# Patient Record
Sex: MALE | Race: WHITE | NOT HISPANIC OR LATINO | URBAN - METROPOLITAN AREA
[De-identification: names, ages, dates, MRNs, and addresses within clinical notes are randomized per-mention and may not be internally consistent; named-entity substitution may affect disease eponyms.]

---

## 2019-06-24 ENCOUNTER — APPOINTMENT (RX ONLY)
Dept: URBAN - METROPOLITAN AREA CLINIC 51 | Facility: CLINIC | Age: 64
Setting detail: DERMATOLOGY
End: 2019-06-24

## 2019-06-24 DIAGNOSIS — L72.8 OTHER FOLLICULAR CYSTS OF THE SKIN AND SUBCUTANEOUS TISSUE: ICD-10-CM

## 2019-06-24 DIAGNOSIS — L57.0 ACTINIC KERATOSIS: ICD-10-CM

## 2019-06-24 DIAGNOSIS — L91.8 OTHER HYPERTROPHIC DISORDERS OF THE SKIN: ICD-10-CM

## 2019-06-24 DIAGNOSIS — L82.1 OTHER SEBORRHEIC KERATOSIS: ICD-10-CM

## 2019-06-24 PROBLEM — H91.90 UNSPECIFIED HEARING LOSS, UNSPECIFIED EAR: Status: ACTIVE | Noted: 2019-06-24

## 2019-06-24 PROBLEM — J44.9 CHRONIC OBSTRUCTIVE PULMONARY DISEASE, UNSPECIFIED: Status: ACTIVE | Noted: 2019-06-24

## 2019-06-24 PROBLEM — M12.9 ARTHROPATHY, UNSPECIFIED: Status: ACTIVE | Noted: 2019-06-24

## 2019-06-24 PROCEDURE — ? COUNSELING

## 2019-06-24 PROCEDURE — 99202 OFFICE O/P NEW SF 15 MIN: CPT | Mod: 25

## 2019-06-24 PROCEDURE — ? LIQUID NITROGEN

## 2019-06-24 PROCEDURE — 17000 DESTRUCT PREMALG LESION: CPT

## 2019-06-24 ASSESSMENT — LOCATION DETAILED DESCRIPTION DERM
LOCATION DETAILED: LEFT INFERIOR FOREHEAD
LOCATION DETAILED: LEFT PROXIMAL DORSAL FOREARM
LOCATION DETAILED: LEFT CENTRAL LATERAL NECK
LOCATION DETAILED: RIGHT PROXIMAL DORSAL FOREARM
LOCATION DETAILED: INFERIOR THORACIC SPINE
LOCATION DETAILED: RIGHT CENTRAL LATERAL NECK
LOCATION DETAILED: LEFT SUPERIOR UPPER BACK

## 2019-06-24 ASSESSMENT — LOCATION SIMPLE DESCRIPTION DERM
LOCATION SIMPLE: LEFT FOREARM
LOCATION SIMPLE: UPPER BACK
LOCATION SIMPLE: LEFT UPPER BACK
LOCATION SIMPLE: RIGHT FOREARM
LOCATION SIMPLE: NECK
LOCATION SIMPLE: LEFT FOREHEAD

## 2019-06-24 ASSESSMENT — LOCATION ZONE DERM
LOCATION ZONE: TRUNK
LOCATION ZONE: ARM
LOCATION ZONE: NECK
LOCATION ZONE: FACE

## 2019-06-24 ASSESSMENT — PAIN INTENSITY VAS: HOW INTENSE IS YOUR PAIN 0 BEING NO PAIN, 10 BEING THE MOST SEVERE PAIN POSSIBLE?: NO PAIN

## 2019-06-24 NOTE — PROCEDURE: LIQUID NITROGEN
Consent: The patient's verbal consent was obtained including but not limited to risks of crusting, scabbing, blistering, scarring, darker or lighter pigmentary change, recurrence, incomplete removal and infection.
Render Note In Bullet Format When Appropriate: No
Post-Care Instructions: I reviewed with the patient in detail post-care instructions. Patient is to wear sunprotection, and avoid picking at any of the treated lesions. Pt may shower as normal, and apply Vaseline to crusted or scabbing areas.
Number Of Freeze-Thaw Cycles: 2 freeze-thaw cycles
Detail Level: Zone
Render Post-Care Instructions In Note?: yes
Duration Of Freeze Thaw-Cycle (Seconds): 5

## 2021-11-09 ENCOUNTER — APPOINTMENT (RX ONLY)
Dept: URBAN - METROPOLITAN AREA CLINIC 51 | Facility: CLINIC | Age: 66
Setting detail: DERMATOLOGY
End: 2021-11-09

## 2021-11-09 DIAGNOSIS — D18.0 HEMANGIOMA: ICD-10-CM | Status: STABLE

## 2021-11-09 DIAGNOSIS — L82.1 OTHER SEBORRHEIC KERATOSIS: ICD-10-CM | Status: STABLE

## 2021-11-09 DIAGNOSIS — L57.0 ACTINIC KERATOSIS: ICD-10-CM | Status: WORSENING

## 2021-11-09 DIAGNOSIS — L81.4 OTHER MELANIN HYPERPIGMENTATION: ICD-10-CM | Status: STABLE

## 2021-11-09 DIAGNOSIS — B07.8 OTHER VIRAL WARTS: ICD-10-CM | Status: WORSENING

## 2021-11-09 DIAGNOSIS — L82.0 INFLAMED SEBORRHEIC KERATOSIS: ICD-10-CM | Status: WORSENING

## 2021-11-09 DIAGNOSIS — L72.8 OTHER FOLLICULAR CYSTS OF THE SKIN AND SUBCUTANEOUS TISSUE: ICD-10-CM | Status: STABLE

## 2021-11-09 PROBLEM — D18.01 HEMANGIOMA OF SKIN AND SUBCUTANEOUS TISSUE: Status: ACTIVE | Noted: 2021-11-09

## 2021-11-09 PROCEDURE — ? COUNSELING

## 2021-11-09 PROCEDURE — 99213 OFFICE O/P EST LOW 20 MIN: CPT | Mod: 25

## 2021-11-09 PROCEDURE — ? LIQUID NITROGEN

## 2021-11-09 PROCEDURE — 17110 DESTRUCTION B9 LES UP TO 14: CPT

## 2021-11-09 PROCEDURE — 17000 DESTRUCT PREMALG LESION: CPT | Mod: 59

## 2021-11-09 ASSESSMENT — LOCATION DETAILED DESCRIPTION DERM
LOCATION DETAILED: SUPERIOR THORACIC SPINE
LOCATION DETAILED: LEFT POSTERIOR SHOULDER
LOCATION DETAILED: RIGHT SUPERIOR ANTERIOR NECK
LOCATION DETAILED: LEFT DISTAL POSTERIOR UPPER ARM
LOCATION DETAILED: LEFT INFERIOR MEDIAL MIDBACK
LOCATION DETAILED: RIGHT SUPERIOR HELIX
LOCATION DETAILED: LEFT SUPERIOR UPPER BACK
LOCATION DETAILED: RIGHT CENTRAL TEMPLE
LOCATION DETAILED: RIGHT POSTERIOR SHOULDER
LOCATION DETAILED: RIGHT MID-UPPER BACK
LOCATION DETAILED: LEFT SUPERIOR MEDIAL MIDBACK

## 2021-11-09 ASSESSMENT — LOCATION SIMPLE DESCRIPTION DERM
LOCATION SIMPLE: RIGHT UPPER BACK
LOCATION SIMPLE: RIGHT SHOULDER
LOCATION SIMPLE: RIGHT EAR
LOCATION SIMPLE: LEFT SHOULDER
LOCATION SIMPLE: LEFT LOWER BACK
LOCATION SIMPLE: LEFT UPPER ARM
LOCATION SIMPLE: LEFT UPPER BACK
LOCATION SIMPLE: UPPER BACK
LOCATION SIMPLE: RIGHT TEMPLE
LOCATION SIMPLE: RIGHT ANTERIOR NECK

## 2021-11-09 ASSESSMENT — LOCATION ZONE DERM
LOCATION ZONE: ARM
LOCATION ZONE: FACE
LOCATION ZONE: TRUNK
LOCATION ZONE: NECK
LOCATION ZONE: EAR

## 2021-11-09 ASSESSMENT — PAIN INTENSITY VAS: HOW INTENSE IS YOUR PAIN 0 BEING NO PAIN, 10 BEING THE MOST SEVERE PAIN POSSIBLE?: NO PAIN

## 2021-11-09 NOTE — PROCEDURE: LIQUID NITROGEN
Show Applicator Variable?: Yes
Detail Level: Detailed
Post-Care Instructions: I reviewed with the patient in detail post-care instructions. Patient is to wear sunprotection, and avoid picking at any of the treated lesions. Pt may apply Vaseline to crusted or scabbing areas.
Add 52 Modifier (Optional): no
Number Of Freeze-Thaw Cycles: 2 freeze-thaw cycles
Consent: The patient's consent was obtained including but not limited to risks of crusting, scabbing, blistering, scarring, darker or lighter pigmentary change, recurrence, incomplete removal and infection.
Medical Necessity Information: It is in your best interest to select a reason for this procedure from the list below. All of these items fulfill various CMS LCD requirements except the new and changing color options.
Duration Of Freeze Thaw-Cycle (Seconds): 5-10
Pared With?: curette
Duration Of Freeze Thaw-Cycle (Seconds): 5
Consent: The patient's verbal consent was obtained including but not limited to risks of crusting, scabbing, blistering, scarring, darker or lighter pigmentary change, recurrence, incomplete removal and infection.
Post-Care Instructions: I reviewed with the patient in detail post-care instructions. Patient is to wear sunprotection, and avoid picking at any of the treated lesions. Pt may shower as normal, and apply Vaseline to crusted or scabbing areas.
Detail Level: Simple

## 2022-05-31 ENCOUNTER — ANESTHESIA EVENT (OUTPATIENT)
Dept: SURGERY | Age: 67
End: 2022-05-31
Payer: MEDICARE

## 2022-05-31 ENCOUNTER — HOSPITAL ENCOUNTER (OUTPATIENT)
Dept: PREADMISSION TESTING | Age: 67
Discharge: HOME | End: 2022-05-31
Payer: MEDICARE

## 2022-05-31 ENCOUNTER — HOSPITAL ENCOUNTER (OUTPATIENT)
Dept: PREADMISSION TESTING | Age: 67
Discharge: HOME | End: 2022-05-31

## 2022-05-31 VITALS — WEIGHT: 201.06 LBS | BODY MASS INDEX: 31.56 KG/M2 | HEIGHT: 67 IN

## 2022-05-31 PROBLEM — E78.00 HIGH CHOLESTEROL: Chronic | Status: ACTIVE | Noted: 2022-05-31

## 2022-05-31 PROBLEM — S76.111A QUADRICEPS TENDON RUPTURE, RIGHT, INITIAL ENCOUNTER: Status: ACTIVE | Noted: 2022-05-31

## 2022-05-31 PROBLEM — E78.00 HIGH CHOLESTEROL: Status: ACTIVE | Noted: 2022-05-31

## 2022-05-31 PROBLEM — I10 HTN (HYPERTENSION): Chronic | Status: ACTIVE | Noted: 2022-05-31

## 2022-05-31 PROBLEM — K21.9 GERD (GASTROESOPHAGEAL REFLUX DISEASE): Chronic | Status: ACTIVE | Noted: 2022-05-31

## 2022-05-31 PROBLEM — E03.9 ACQUIRED HYPOTHYROIDISM: Status: ACTIVE | Noted: 2022-05-31

## 2022-05-31 PROBLEM — S76.111A QUADRICEPS TENDON RUPTURE, RIGHT, INITIAL ENCOUNTER: Chronic | Status: ACTIVE | Noted: 2022-05-31

## 2022-05-31 PROBLEM — E03.9 ACQUIRED HYPOTHYROIDISM: Chronic | Status: ACTIVE | Noted: 2022-05-31

## 2022-05-31 PROBLEM — I25.2 HISTORY OF MI (MYOCARDIAL INFARCTION): Status: ACTIVE | Noted: 2022-05-31

## 2022-05-31 PROBLEM — I25.2 HISTORY OF MI (MYOCARDIAL INFARCTION): Chronic | Status: ACTIVE | Noted: 2022-05-31

## 2022-05-31 PROBLEM — I10 HTN (HYPERTENSION): Status: ACTIVE | Noted: 2022-05-31

## 2022-05-31 PROBLEM — K21.9 GERD (GASTROESOPHAGEAL REFLUX DISEASE): Status: ACTIVE | Noted: 2022-05-31

## 2022-05-31 LAB
ALBUMIN SERPL-MCNC: 3.6 G/DL (ref 3.4–5)
ALBUMIN/GLOB SERPL: 1 {RATIO} (ref 0.8–1.7)
ALP SERPL-CCNC: 77 U/L (ref 45–117)
ALT SERPL-CCNC: 23 U/L (ref 16–61)
ANION GAP SERPL CALC-SCNC: 6 MMOL/L (ref 3–18)
APTT PPP: 25.6 SEC (ref 23–36.4)
AST SERPL-CCNC: 17 U/L (ref 10–38)
BILIRUB SERPL-MCNC: 1.3 MG/DL (ref 0.2–1)
BUN SERPL-MCNC: 13 MG/DL (ref 7–18)
BUN/CREAT SERPL: 13 (ref 12–20)
CALCIUM SERPL-MCNC: 8.9 MG/DL (ref 8.5–10.1)
CHLORIDE SERPL-SCNC: 106 MMOL/L (ref 100–111)
CO2 SERPL-SCNC: 29 MMOL/L (ref 21–32)
CREAT SERPL-MCNC: 1 MG/DL (ref 0.6–1.3)
ERYTHROCYTE [DISTWIDTH] IN BLOOD BY AUTOMATED COUNT: 11.7 % (ref 11.6–14.5)
GLOBULIN SER CALC-MCNC: 3.7 G/DL (ref 2–4)
GLUCOSE SERPL-MCNC: 94 MG/DL (ref 74–99)
HCT VFR BLD AUTO: 47.9 % (ref 36–48)
HGB BLD-MCNC: 15.7 G/DL (ref 13–16)
INR PPP: 1.2 (ref 0.8–1.2)
MCH RBC QN AUTO: 31.1 PG (ref 24–34)
MCHC RBC AUTO-ENTMCNC: 32.8 G/DL (ref 31–37)
MCV RBC AUTO: 94.9 FL (ref 78–100)
NRBC # BLD: 0 K/UL (ref 0–0.01)
NRBC BLD-RTO: 0 PER 100 WBC
PLATELET # BLD AUTO: 146 K/UL (ref 135–420)
PMV BLD AUTO: 12.5 FL (ref 9.2–11.8)
POTASSIUM SERPL-SCNC: 3.9 MMOL/L (ref 3.5–5.5)
PROT SERPL-MCNC: 7.3 G/DL (ref 6.4–8.2)
PROTHROMBIN TIME: 15.1 SEC (ref 11.5–15.2)
RBC # BLD AUTO: 5.05 M/UL (ref 4.35–5.65)
SODIUM SERPL-SCNC: 141 MMOL/L (ref 136–145)
WBC # BLD AUTO: 4.7 K/UL (ref 4.6–13.2)

## 2022-05-31 PROCEDURE — 93005 ELECTROCARDIOGRAM TRACING: CPT

## 2022-05-31 PROCEDURE — 85730 THROMBOPLASTIN TIME PARTIAL: CPT

## 2022-05-31 PROCEDURE — 85027 COMPLETE CBC AUTOMATED: CPT

## 2022-05-31 PROCEDURE — 85610 PROTHROMBIN TIME: CPT

## 2022-05-31 PROCEDURE — 36415 COLL VENOUS BLD VENIPUNCTURE: CPT

## 2022-05-31 PROCEDURE — 80053 COMPREHEN METABOLIC PANEL: CPT

## 2022-05-31 RX ORDER — SODIUM CHLORIDE 0.9 % (FLUSH) 0.9 %
5-40 SYRINGE (ML) INJECTION EVERY 8 HOURS
Status: CANCELLED | OUTPATIENT
Start: 2022-05-31

## 2022-05-31 RX ORDER — METOPROLOL SUCCINATE 50 MG/1
TABLET, EXTENDED RELEASE ORAL DAILY
COMMUNITY

## 2022-05-31 RX ORDER — ATENOLOL 25 MG/1
1 TABLET ORAL DAILY
COMMUNITY
Start: 2021-07-19 | End: 2022-05-31 | Stop reason: CLARIF

## 2022-05-31 RX ORDER — LEVOTHYROXINE SODIUM 88 UG/1
88 TABLET ORAL DAILY
COMMUNITY
End: 2022-05-31 | Stop reason: CLARIF

## 2022-05-31 RX ORDER — OXYCODONE HYDROCHLORIDE 5 MG/1
5 TABLET ORAL AS NEEDED
Status: CANCELLED | OUTPATIENT
Start: 2022-05-31

## 2022-05-31 RX ORDER — LAMOTRIGINE 100 MG/1
300 TABLET ORAL
COMMUNITY
End: 2022-05-31 | Stop reason: CLARIF

## 2022-05-31 RX ORDER — TRAMADOL HYDROCHLORIDE 50 MG/1
50-100 TABLET ORAL
COMMUNITY

## 2022-05-31 RX ORDER — SACUBITRIL AND VALSARTAN 97; 103 MG/1; MG/1
1 TABLET, FILM COATED ORAL 2 TIMES DAILY
COMMUNITY
Start: 2021-09-10

## 2022-05-31 RX ORDER — ATORVASTATIN CALCIUM 80 MG/1
1 TABLET, FILM COATED ORAL DAILY
COMMUNITY
Start: 2022-04-08

## 2022-05-31 RX ORDER — LISINOPRIL 5 MG/1
5 TABLET ORAL DAILY
COMMUNITY
End: 2022-05-31 | Stop reason: CLARIF

## 2022-05-31 RX ORDER — LEVETIRACETAM 500 MG/1
TABLET ORAL 2 TIMES DAILY
COMMUNITY

## 2022-05-31 RX ORDER — MELOXICAM 15 MG/1
15 TABLET ORAL DAILY
COMMUNITY

## 2022-05-31 RX ORDER — SODIUM CHLORIDE, SODIUM LACTATE, POTASSIUM CHLORIDE, CALCIUM CHLORIDE 600; 310; 30; 20 MG/100ML; MG/100ML; MG/100ML; MG/100ML
125 INJECTION, SOLUTION INTRAVENOUS CONTINUOUS
Status: CANCELLED | OUTPATIENT
Start: 2022-05-31

## 2022-05-31 RX ORDER — OMEPRAZOLE 40 MG/1
CAPSULE, DELAYED RELEASE ORAL
COMMUNITY
Start: 2022-04-25

## 2022-05-31 RX ORDER — LEVOTHYROXINE SODIUM 100 UG/1
100 TABLET ORAL DAILY
COMMUNITY
Start: 2022-01-27

## 2022-05-31 RX ORDER — SODIUM CHLORIDE 0.9 % (FLUSH) 0.9 %
5-40 SYRINGE (ML) INJECTION AS NEEDED
Status: CANCELLED | OUTPATIENT
Start: 2022-05-31

## 2022-05-31 RX ORDER — CELECOXIB 200 MG/1
200 CAPSULE ORAL DAILY
COMMUNITY
End: 2022-06-01

## 2022-05-31 RX ORDER — FAMOTIDINE 40 MG/1
40 TABLET, FILM COATED ORAL DAILY
COMMUNITY
Start: 2021-12-15

## 2022-05-31 RX ORDER — ASPIRIN 81 MG/1
81 TABLET ORAL DAILY
COMMUNITY

## 2022-05-31 NOTE — PERIOP NOTES
Leave all valuables at home or loved ones;to include wallets/purse, money/credit cards, electronics  such as laptops and tablets. If you want to have your prescriptions filled here, please have some form of payment with your . Please arrange for your transportation home Patient states family physician is aware of upcoming procedure/surgery. Do not put any lotion, jewelry, makeup, fingernail or toenail polish; no wigs, no private piercings; no tictac,gum and mouthwash. Denies sleep apnea. Denies family history of anesthesia complications. Please be aware that due to unforeseen circumstances, delays may occur and your patience will be appreciated. If you ae scheduled to be discharged the same day, please plan to be with us for most of the day. If an inpatient, room assignments may be delayed as well. Our priority is to make you as comfortable as possible and to keep your family informed of your status when possible. No dnr  LM with [facility] cardiology medical records and faxed request for urgent. [employer]; rep contacted will need magnet if you use cautery.

## 2022-05-31 NOTE — H&P
History and Physical        Patient: Alexy Cruise               Sex: male          DOA: (Not on file)         YOB: 1955      Age:  78 y.o.        LOS:  LOS: 0 days        HPI:     Nehemias Salgado is in for a new problem today regarding a right quadriceps tendon rupture from 04/23/2022/mild medial tibiofemoral/patellofemoral DJD/status post left knee arthroscopy 2013 with grade 3 medial and grade 2 patellar changes/[employer]. The patient is in today. He tripped and fell wearing some long nosed shoes in April of 2022. When he landed, he landed on his knee in a flexed fashion. He has had a lot of swelling and discomfort since that time. He is currently on Plavix and aspirin. He is now here today for evaluation and treatment. His wife reports he continues to fall. X-rays of the right knee reviewed from outside show patella baja and calcifications that have migrated proximal to this patella. Past Medical History:   Diagnosis Date    Arthritis     CAD (coronary artery disease)     mi 2004    Chronic pain     back    GERD (gastroesophageal reflux disease)     Hypercholesteremia     Hypertension 2000    Thyroid disease     hyper       Past Surgical History:   Procedure Laterality Date    HX KNEE ARTHROSCOPY Bilateral     HX ORTHOPAEDIC      back fused     HX ORTHOPAEDIC      neck fused    HX PACEMAKER      [employer] left chest defib    HX SHOULDER ARTHROSCOPY      tear    VASCULAR SURGERY PROCEDURE UNLIST      3 stents       No family history on file. Social History     Socioeconomic History    Marital status:    Tobacco Use    Smoking status: Former Smoker    Smokeless tobacco: Never Used   Vaping Use    Vaping Use: Never used   Substance and Sexual Activity    Alcohol use: Not Currently    Drug use: Never    Sexual activity: Never       Prior to Admission medications    Medication Sig Start Date End Date Taking?  Authorizing Provider aspirin delayed-release 81 mg tablet Take 81 mg by mouth daily. Provider, Historical   atorvastatin (LIPITOR) 80 mg tablet Take 1 Tablet by mouth daily. 4/8/22   Provider, Historical   celecoxib (CELEBREX) 200 mg capsule Take 200 mg by mouth daily. Provider, Historical   famotidine (PEPCID) 40 mg tablet Take 40 mg by mouth daily. 12/15/21   Provider, Historical   sacubitriL-valsartan (Entresto)  mg tablet Take 1 Tablet by mouth two (2) times a day. 9/10/21   Provider, Historical   ticagrelor (BRILINTA) 60 mg tab tablet Take 60 mg by mouth. Provider, Historical   traMADoL (ULTRAM) 50 mg tablet Take  mg by mouth nightly. Provider, Historical   omeprazole (PRILOSEC) 40 mg capsule TAKE ONE CAPSULE BY MOUTH EVERY MORNING BEORE BREAKFAST 4/25/22   Provider, Historical   levothyroxine (SYNTHROID) 100 mcg tablet Take 100 mcg by mouth daily. 1/27/22   Provider, Historical   levETIRAcetam (KEPPRA) 500 mg tablet Take  by mouth two (2) times a day. Indications: back pain    Provider, Historical   metoprolol succinate (TOPROL-XL) 50 mg XL tablet Take  by mouth daily. Provider, Historical   meloxicam (MOBIC) 15 mg tablet Take 15 mg by mouth daily. Provider, Historical       Allergies   Allergen Reactions    Sting, Bee Other (comments)    Amoxicillin-Pot Clavulanate Diarrhea     diarrhea          Omega-3 Acid Ethyl Esters Other (comments)     Woozy and legs swelled            Review of Systems  GENERAL:  Patient has no signs of fever, chills or weight change. or fatigue  HEAD/ENTM:  Patient has no signs of headaches, dizziness, hearing loss, ringing in ears, sore throat/hoarseness, recent cold, double vision, blurred vision, itchy eyes, eye redness or eye discharge. NEUROLOGIC:  Patient has no signs of fainting, muscle weakness, numbness/tingling, loss of balance or seizure disorder.   CARDIOVASCULAR:  Patient has no signs of chest pain, palpitations, rheumatic fever or heart murmur. RESPIRATORY:  Patient has no signs of chronic cough, wheezing, difficulty breathing, pain on breathing or shortness of breath. GASTROINTESTINAL:  Patient has no signs of nausea/vomiting, difficulty swallowing, gas/bloating, indigestion, abdominal pain, diarrhea, bloody stools or hemorrhoids. GENITOURINARY:  Patient has no signs of blood in urine, painful urinating, burning sensation, bladder/kidney infection, frequent urinating or incontinence. MUSCULOSKELETAL: Patient presents with joint pain. Patient has no signs of fracture/dislocation, sprain/strain, tendonitis, joint stiffness, rheumatoid disease, gout or swelling of feet. INTEGUMENTARY:  Patient has no signs of rash/itching, psoriasis, Raynaud's phenomenon or varicose veins. EMOTIONAL:  Patient has no signs of anxiety, depression, bipolar disorder, memory loss or change in mood. Physical Exam:      There were no vitals taken for this visit. Physical Exam:  Physical exam shows that his right knee will not straighten fully. He is missing about the last 30 degrees of extension. He has swelling in the quadriceps tendon region of his knee. There is a palpable defect that is present. He has no significant pain on the medial or lateral joint lines. The patient has good capillary refill with normal motor strength of the foot and ankle and normal light-touch sensation in the foot and lower leg. Assessment/Plan     Principal Problem:    Quadriceps tendon rupture, right, initial encounter (5/31/2022)    Active Problems:    GERD (gastroesophageal reflux disease) (5/31/2022)      High cholesterol (5/31/2022)      HTN (hypertension) (5/31/2022)      History of MI (myocardial infarction) (5/31/2022)      Acquired hypothyroidism (5/31/2022)    Dr. Farshad Khan scheduled him for a right quadriceps tendon repair at [facility] June 1, 2022.   He talked to the patient about the risks, the alternatives, and the benefits including infection, pain, bleeding, and DVT. He understands these and is willing to proceed. Dr. Poly Trimble  issued Keflex and Roxicodone for perioperative pain management. He given him a prescription today to get a range of motion brace ahead of time if he can. If not, we will issue him one at the time of the OR. Dr. Poly Trimble will see him again 10 days postop. He discussed the postoperative protocol with him today.

## 2022-06-01 ENCOUNTER — ANESTHESIA (OUTPATIENT)
Dept: SURGERY | Age: 67
End: 2022-06-01
Payer: MEDICARE

## 2022-06-01 ENCOUNTER — HOSPITAL ENCOUNTER (OUTPATIENT)
Age: 67
Discharge: HOME | End: 2022-06-01
Attending: ORTHOPAEDIC SURGERY | Admitting: ORTHOPAEDIC SURGERY
Payer: MEDICARE

## 2022-06-01 VITALS
SYSTOLIC BLOOD PRESSURE: 170 MMHG | HEIGHT: 67 IN | WEIGHT: 197 LBS | OXYGEN SATURATION: 99 % | RESPIRATION RATE: 14 BRPM | HEART RATE: 69 BPM | BODY MASS INDEX: 30.92 KG/M2 | DIASTOLIC BLOOD PRESSURE: 75 MMHG | TEMPERATURE: 97.5 F

## 2022-06-01 LAB
ATRIAL RATE: 44 BPM
CALCULATED P AXIS, ECG09: 45 DEGREES
CALCULATED R AXIS, ECG10: -102 DEGREES
CALCULATED T AXIS, ECG11: 142 DEGREES
DIAGNOSIS, 93000: NORMAL
P-R INTERVAL, ECG05: 100 MS
Q-T INTERVAL, ECG07: 548 MS
QRS DURATION, ECG06: 166 MS
QTC CALCULATION (BEZET), ECG08: 468 MS
VENTRICULAR RATE, ECG03: 44 BPM

## 2022-06-01 PROCEDURE — 74011000250 HC RX REV CODE- 250: Performed by: ORTHOPAEDIC SURGERY

## 2022-06-01 PROCEDURE — 74011250636 HC RX REV CODE- 250/636: Performed by: PHYSICIAN ASSISTANT

## 2022-06-01 PROCEDURE — 76210000026 HC REC RM PH II 1 TO 1.5 HR: Performed by: ORTHOPAEDIC SURGERY

## 2022-06-01 PROCEDURE — 76942 ECHO GUIDE FOR BIOPSY: CPT | Performed by: ORTHOPAEDIC SURGERY

## 2022-06-01 PROCEDURE — 77030016060 HC NDL NRV BLK TELE -A: Performed by: ANESTHESIOLOGY

## 2022-06-01 PROCEDURE — 74011000250 HC RX REV CODE- 250: Performed by: NURSE ANESTHETIST, CERTIFIED REGISTERED

## 2022-06-01 PROCEDURE — L1810 KO ELASTIC WITH JOINTS: HCPCS | Performed by: ORTHOPAEDIC SURGERY

## 2022-06-01 PROCEDURE — 74011250637 HC RX REV CODE- 250/637: Performed by: ANESTHESIOLOGY

## 2022-06-01 PROCEDURE — 76010000153 HC OR TIME 1.5 TO 2 HR: Performed by: ORTHOPAEDIC SURGERY

## 2022-06-01 PROCEDURE — 77030008190 HC RTVR SUT ARTH S&N -B: Performed by: ORTHOPAEDIC SURGERY

## 2022-06-01 PROCEDURE — 74011250636 HC RX REV CODE- 250/636: Performed by: ANESTHESIOLOGY

## 2022-06-01 PROCEDURE — 74011250636 HC RX REV CODE- 250/636: Performed by: NURSE ANESTHETIST, CERTIFIED REGISTERED

## 2022-06-01 PROCEDURE — 77030013708 HC HNDPC SUC IRR PULS STRY –B: Performed by: ORTHOPAEDIC SURGERY

## 2022-06-01 PROCEDURE — 77030011256 HC DRSG MEPILEX <16IN NO BORD MOLN -A: Performed by: ORTHOPAEDIC SURGERY

## 2022-06-01 PROCEDURE — 77030040361 HC SLV COMPR DVT MDII -B: Performed by: ORTHOPAEDIC SURGERY

## 2022-06-01 PROCEDURE — 2709999900 HC NON-CHARGEABLE SUPPLY: Performed by: ORTHOPAEDIC SURGERY

## 2022-06-01 PROCEDURE — 74011000258 HC RX REV CODE- 258: Performed by: ORTHOPAEDIC SURGERY

## 2022-06-01 PROCEDURE — 74011000250 HC RX REV CODE- 250: Performed by: ANESTHESIOLOGY

## 2022-06-01 PROCEDURE — 76060000034 HC ANESTHESIA 1.5 TO 2 HR: Performed by: ORTHOPAEDIC SURGERY

## 2022-06-01 PROCEDURE — 77030031139 HC SUT VCRL2 J&J -A: Performed by: ORTHOPAEDIC SURGERY

## 2022-06-01 PROCEDURE — 77030034479 HC ADH SKN CLSR PRINEO J&J -B: Performed by: ORTHOPAEDIC SURGERY

## 2022-06-01 PROCEDURE — 77030020782 HC GWN BAIR PAWS FLX 3M -B: Performed by: ORTHOPAEDIC SURGERY

## 2022-06-01 PROCEDURE — 77030000032 HC CUF TRNQT ZIMM -B: Performed by: ORTHOPAEDIC SURGERY

## 2022-06-01 PROCEDURE — 76210000016 HC OR PH I REC 1 TO 1.5 HR: Performed by: ORTHOPAEDIC SURGERY

## 2022-06-01 PROCEDURE — C1713 ANCHOR/SCREW BN/BN,TIS/BN: HCPCS | Performed by: ORTHOPAEDIC SURGERY

## 2022-06-01 PROCEDURE — 74011000272 HC RX REV CODE- 272: Performed by: ORTHOPAEDIC SURGERY

## 2022-06-01 PROCEDURE — 74011250636 HC RX REV CODE- 250/636: Performed by: ORTHOPAEDIC SURGERY

## 2022-06-01 PROCEDURE — 64447 NJX AA&/STRD FEMORAL NRV IMG: CPT | Performed by: ANESTHESIOLOGY

## 2022-06-01 PROCEDURE — 77030012508 HC MSK AIRWY LMA AMBU -A: Performed by: ANESTHESIOLOGY

## 2022-06-01 RX ORDER — LIDOCAINE HYDROCHLORIDE 20 MG/ML
INJECTION, SOLUTION EPIDURAL; INFILTRATION; INTRACAUDAL; PERINEURAL AS NEEDED
Status: DISCONTINUED | OUTPATIENT
Start: 2022-06-01 | End: 2022-06-01 | Stop reason: HOSPADM

## 2022-06-01 RX ORDER — SODIUM CHLORIDE 0.9 % (FLUSH) 0.9 %
5-40 SYRINGE (ML) INJECTION EVERY 8 HOURS
Status: DISCONTINUED | OUTPATIENT
Start: 2022-06-01 | End: 2022-06-01 | Stop reason: HOSPADM

## 2022-06-01 RX ORDER — HYDROMORPHONE HYDROCHLORIDE 1 MG/ML
0.5 INJECTION, SOLUTION INTRAMUSCULAR; INTRAVENOUS; SUBCUTANEOUS
Status: DISCONTINUED | OUTPATIENT
Start: 2022-06-01 | End: 2022-06-01 | Stop reason: HOSPADM

## 2022-06-01 RX ORDER — DEXMEDETOMIDINE HYDROCHLORIDE 100 UG/ML
INJECTION, SOLUTION INTRAVENOUS
Status: SHIPPED | OUTPATIENT
Start: 2022-06-01 | End: 2022-06-01

## 2022-06-01 RX ORDER — SODIUM CHLORIDE, SODIUM LACTATE, POTASSIUM CHLORIDE, CALCIUM CHLORIDE 600; 310; 30; 20 MG/100ML; MG/100ML; MG/100ML; MG/100ML
125 INJECTION, SOLUTION INTRAVENOUS CONTINUOUS
Status: DISCONTINUED | OUTPATIENT
Start: 2022-06-01 | End: 2022-06-01 | Stop reason: HOSPADM

## 2022-06-01 RX ORDER — ACETAMINOPHEN 500 MG
1000 TABLET ORAL
Status: COMPLETED | OUTPATIENT
Start: 2022-06-01 | End: 2022-06-01

## 2022-06-01 RX ORDER — KETAMINE HYDROCHLORIDE 10 MG/ML
INJECTION, SOLUTION INTRAMUSCULAR; INTRAVENOUS AS NEEDED
Status: DISCONTINUED | OUTPATIENT
Start: 2022-06-01 | End: 2022-06-01 | Stop reason: HOSPADM

## 2022-06-01 RX ORDER — CEFAZOLIN SODIUM/WATER 2 G/20 ML
2 SYRINGE (ML) INTRAVENOUS ONCE
Status: COMPLETED | OUTPATIENT
Start: 2022-06-01 | End: 2022-06-01

## 2022-06-01 RX ORDER — DEXAMETHASONE SODIUM PHOSPHATE 10 MG/ML
INJECTION INTRAMUSCULAR; INTRAVENOUS
Status: SHIPPED | OUTPATIENT
Start: 2022-06-01 | End: 2022-06-01

## 2022-06-01 RX ORDER — MIDAZOLAM HYDROCHLORIDE 1 MG/ML
INJECTION, SOLUTION INTRAMUSCULAR; INTRAVENOUS AS NEEDED
Status: DISCONTINUED | OUTPATIENT
Start: 2022-06-01 | End: 2022-06-01 | Stop reason: HOSPADM

## 2022-06-01 RX ORDER — SODIUM CHLORIDE 0.9 % (FLUSH) 0.9 %
5-40 SYRINGE (ML) INJECTION AS NEEDED
Status: DISCONTINUED | OUTPATIENT
Start: 2022-06-01 | End: 2022-06-01 | Stop reason: HOSPADM

## 2022-06-01 RX ORDER — FENTANYL CITRATE 50 UG/ML
25 INJECTION, SOLUTION INTRAMUSCULAR; INTRAVENOUS AS NEEDED
Status: DISCONTINUED | OUTPATIENT
Start: 2022-06-01 | End: 2022-06-01 | Stop reason: HOSPADM

## 2022-06-01 RX ORDER — CEPHALEXIN 500 MG/1
500 CAPSULE ORAL 4 TIMES DAILY
Qty: 4 CAPSULE | Refills: 0 | Status: SHIPPED
Start: 2022-06-01 | End: 2022-06-02

## 2022-06-01 RX ORDER — ONDANSETRON 2 MG/ML
INJECTION INTRAMUSCULAR; INTRAVENOUS AS NEEDED
Status: DISCONTINUED | OUTPATIENT
Start: 2022-06-01 | End: 2022-06-01 | Stop reason: HOSPADM

## 2022-06-01 RX ORDER — PROPOFOL 10 MG/ML
INJECTION, EMULSION INTRAVENOUS AS NEEDED
Status: DISCONTINUED | OUTPATIENT
Start: 2022-06-01 | End: 2022-06-01 | Stop reason: HOSPADM

## 2022-06-01 RX ORDER — ROPIVACAINE HYDROCHLORIDE 2 MG/ML
INJECTION, SOLUTION EPIDURAL; INFILTRATION; PERINEURAL
Status: SHIPPED | OUTPATIENT
Start: 2022-06-01 | End: 2022-06-01

## 2022-06-01 RX ORDER — HYDROMORPHONE HYDROCHLORIDE 2 MG/ML
INJECTION, SOLUTION INTRAMUSCULAR; INTRAVENOUS; SUBCUTANEOUS AS NEEDED
Status: DISCONTINUED | OUTPATIENT
Start: 2022-06-01 | End: 2022-06-01 | Stop reason: HOSPADM

## 2022-06-01 RX ADMIN — PROPOFOL 150 MG: 10 INJECTION, EMULSION INTRAVENOUS at 13:53

## 2022-06-01 RX ADMIN — DEXMEDETOMIDINE HYDROCHLORIDE 15 MCG: 100 INJECTION, SOLUTION INTRAVENOUS at 12:22

## 2022-06-01 RX ADMIN — ROPIVACAINE HYDROCHLORIDE 25 ML: 2 INJECTION, SOLUTION EPIDURAL; INFILTRATION at 12:22

## 2022-06-01 RX ADMIN — SODIUM CHLORIDE, SODIUM LACTATE, POTASSIUM CHLORIDE, AND CALCIUM CHLORIDE 125 ML/HR: 600; 310; 30; 20 INJECTION, SOLUTION INTRAVENOUS at 15:55

## 2022-06-01 RX ADMIN — MIDAZOLAM 1 MG: 1 INJECTION INTRAMUSCULAR; INTRAVENOUS at 12:18

## 2022-06-01 RX ADMIN — DEXAMETHASONE SODIUM PHOSPHATE 4 MG: 10 INJECTION, SOLUTION INTRAMUSCULAR; INTRAVENOUS at 12:22

## 2022-06-01 RX ADMIN — ACETAMINOPHEN 1000 MG: 500 TABLET ORAL at 11:07

## 2022-06-01 RX ADMIN — HYDROMORPHONE HYDROCHLORIDE 0.5 MG: 2 INJECTION, SOLUTION INTRAMUSCULAR; INTRAVENOUS; SUBCUTANEOUS at 14:23

## 2022-06-01 RX ADMIN — KETAMINE HYDROCHLORIDE 30 MG: 10 INJECTION, SOLUTION INTRAMUSCULAR; INTRAVENOUS at 14:11

## 2022-06-01 RX ADMIN — MIDAZOLAM 4 MG: 1 INJECTION INTRAMUSCULAR; INTRAVENOUS at 12:17

## 2022-06-01 RX ADMIN — Medication 2 G: at 13:58

## 2022-06-01 RX ADMIN — HYDROMORPHONE HYDROCHLORIDE 0.5 MG: 2 INJECTION, SOLUTION INTRAMUSCULAR; INTRAVENOUS; SUBCUTANEOUS at 14:19

## 2022-06-01 RX ADMIN — LIDOCAINE HYDROCHLORIDE 80 MG: 20 INJECTION, SOLUTION INTRAVENOUS at 13:53

## 2022-06-01 RX ADMIN — HYDROMORPHONE HYDROCHLORIDE 1 MG: 2 INJECTION, SOLUTION INTRAMUSCULAR; INTRAVENOUS; SUBCUTANEOUS at 14:31

## 2022-06-01 RX ADMIN — MIDAZOLAM 2 MG: 1 INJECTION INTRAMUSCULAR; INTRAVENOUS at 13:44

## 2022-06-01 RX ADMIN — SODIUM CHLORIDE, POTASSIUM CHLORIDE, SODIUM LACTATE AND CALCIUM CHLORIDE 125 ML/HR: 600; 310; 30; 20 INJECTION, SOLUTION INTRAVENOUS at 11:05

## 2022-06-01 RX ADMIN — ONDANSETRON HYDROCHLORIDE 4 MG: 2 INJECTION INTRAMUSCULAR; INTRAVENOUS at 15:06

## 2022-06-01 RX ADMIN — KETAMINE HYDROCHLORIDE 10 MG: 10 INJECTION, SOLUTION INTRAMUSCULAR; INTRAVENOUS at 15:00

## 2022-06-01 RX ADMIN — KETAMINE HYDROCHLORIDE 10 MG: 10 INJECTION, SOLUTION INTRAMUSCULAR; INTRAVENOUS at 14:43

## 2022-06-01 NOTE — ANESTHESIA PROCEDURE NOTES
Peripheral Block    Start time: 6/1/2022 12:17 PM  End time: 6/1/2022 12:22 PM  Performed by: Ashok Paredes MD  Authorized by: Ashok Paredes MD       Pre-procedure: Indications: at surgeon's request and post-op pain management    Preanesthetic Checklist: patient identified, risks and benefits discussed, site marked, timeout performed, anesthesia consent given and patient being monitored    Timeout Time: 12:17 EDT          Block Type:   Block Type: Adductor canal block  Laterality:  Right  Monitoring:  Standard ASA monitoring, continuous pulse ox, frequent vital sign checks, heart rate, oxygen and responsive to questions  Injection Technique:  Single shot  Procedures: ultrasound guided    Procedures comment:  For needle placement  Patient Position: supine  Prep: chlorhexidine    Location:  Mid thigh  Needle Type:  Stimuplex  Needle Gauge:  21 G  Needle Localization:  Ultrasound guidance  Medication Injected:  Ropivacaine (NAROPIN) 2 mg/mL (0.2 %) injection, 25 mL  dexamethasone (PF) (DECADRON) 10 mg/mL injection, 4 mg  dexmedeTOMidine (PRECEDEX) 100 mcg/mL iv solution, 15 mcg  Med Admin Time: 6/1/2022 12:22 PM    Assessment:  Number of attempts:  1  Injection Assessment:  Incremental injection every 5 mL, negative aspiration for CSF, no paresthesia, ultrasound image on chart, local visualized surrounding nerve on ultrasound, negative aspiration for blood and no intravascular symptoms  Patient tolerance:  Patient tolerated the procedure well with no immediate complications  Patient able to straight leg raise right leg after block. No sensory or motor block.

## 2022-06-01 NOTE — ANESTHESIA POSTPROCEDURE EVALUATION
Post-Anesthesia Evaluation and Assessment    Cardiovascular Function/Vital Signs  Visit Vitals  BP (!) 177/84   Pulse (!) 59   Temp 36.2 °C (97.1 °F)   Resp 15   Ht 5' 7\" (1.702 m)   Wt 89.4 kg (197 lb)   SpO2 100%   BMI 30.85 kg/m²       Patient is status post Procedure(s):  RIGHT LEG QUADRICEPS REPAIR. Nausea/Vomiting: Controlled. Postoperative hydration reviewed and adequate. Pain:  Pain Scale 1: Visual (06/01/22 1535)  Pain Intensity 1: 0 (06/01/22 1535)   Managed. Neurological Status:   Neuro (WDL): Exceptions to WDL (06/01/22 1535)   At baseline. Mental Status and Level of Consciousness: Baseline and stable. Pulmonary Status:   O2 Device: Nasal cannula (06/01/22 1604)   Adequate oxygenation and airway patent. Complications related to anesthesia: None    Post-anesthesia assessment completed. No concerns. Patient has met all discharge requirements.     Signed By: Dunia Caraballo MD

## 2022-06-01 NOTE — DISCHARGE INSTRUCTIONS
Lidia Kussmaul, III, MD  [facility]    Lower Extrem[facility] Surgery  Discharge Instructions      Please take the time to review the following instructions before you leave the hospital and use them as guidelines during your recovery from surgery. If you have any questions you may contact my office at [phone]. Wound Care/Dressing Changes:    []   Keep the surgical dressing on for two days from the day of your surgery. Once you remove this, no dressing is necessary if there is no drainage. [x]   You may change your dressing as needed. Beginning the 2 days after you are discharged from the hospital you can change your dressing daily. A big, bulky dressing isn't necessary as long as there isn't any drainage from the incisions. You can put a band-aid or a pice of gauze over each incision and wear an ACE bandage as needed for comfort and swelling. Please replace the immobilizer after dressing changes. []   Don't remove your dressing or get them wet. · It isn't necessary to apply antibiotic ointment to your incisions. Sutures will be removed at your one week post-op visit. Staples (if you have them) are removed in two weeks. If you have steri-strips over your incision they will start to peel off in 7-10 days as you get them wet. They don't need to be removed prior to that. When they begin to peel off you can remove them. They should all be removed by 14 days from your surgery. If your wound is closed with Dermabond nothing has to be done or removed. Showering/Bathing:    [x]   You may shower 2 days after surgery. Your dressing may be removed for showering. You may get your incisions wet in the shower. Don't vigorously scrub the area where your incisions are. Apply a clean, dry dressing after drying off the area of your incisions. Don't take a tub bath, get in a swimming pool or Jacuzzi until the incisions are completely healed. Do not soak your incisions under water.   Please replace the immobilizer after dressing changes. []   Do not get the dressing wet. Once you remove it two days from surgery, you may get the incision wet if there is no drainage. Weight Bearing Status/Braces/Activity:    []   You may walk as tolerated and perform your normal daily activities. Use crutches, a walker, or a cane only if you need them. You should strive to achieve full range of motion in your knee as soon as possible. Please start using a stationary bike or walking for exercise 3 or 4 days after surgery. We would like for you to return to your normal activities as soon as possible. If you feel comfortable returning to work, you may do so at any time. [x]   Weight bearing as tolerated with the knee immobilizer in place. Use crutches, cane, or walker as needed. You should sleep in your knee immobilizer. []   Non-weight bearing. Please do not bear any weight on your leg. You may use your toes for balance when walking with a cane or crutches. Ice/Elevation:    Continue ice and elevation consistently for 48 hours after surgery. When elevating your knee elevate it on about 4 pillows to be sure it is above your heart. After 48 hours, you should ice your knee 3 times per day, for 20 minutes at a time for the next 5 days. After one week from surgery, you may use ice and elevation as needed for pain and swelling. Diet:    You may advance to your regular diet as tolerated. Medication:    1. You will be given a prescription for pain medications when you are discharged from the hospital.  Take the medication as needed according to the directions on the prescription bottle. Possible side effects of the medication include dizziness, headache, nausea, vomiting, constipation and urinary retention. If you experience any of these side effects call the office so that we can assist you in relieving them.   Discontinue the use of the pain medication if you develop itching, rash, shortness of breath or difficulties swallowing. If these symptoms become severe or aren't relieved by discontinuing the medication you should seek immediate medical attention. Refills of pain medication are authorized during office hours only (8AM - 5PM Mon thru Fri). 2. If you were prescribed oxycodone, Dilaudid/hydromorphone,or hydrocodon you must have a written prescription. These medications legally cannot be called in to a pharmacy. 3. You may take over the counter Ibuprofen/Advil/Aleve between dosages of your pain medication if needed. Do not take Tylenol in addition to your pain medication as most of the pain medication already contains Tylenol. Exceptions include Dilaudid/hydromorphone, Demerol/meperidine and roxicodone. Do not exceed 3000 mg of Tylenol per day. Ex:  (hydrocodone 5/325mg = 325 mg of Tylenol)   4. If you have had a joint replacement you should take Xarelto 10 mg daily for 28 days from the date of your surgery. This will help to prevent blood clots from forming in your legs. 5. You may resume the medication you were taking prior to your surgery. Pain medication may change the effects of any antidepressant medication. If you have any questions about possible interactions between your regular medications and the pain medication you should consult the physician who prescribes your regular medications. Follow Up Appointment:    If you are unsure of your follow-up appointment date and time, please call [phone]. Please let our  know you are scheduling a post-op appointment. Most appointments should be between 7-14 days after your surgery. Physical Therapy:    []   Physical therapy will be discussed with you at your first follow-up appointment with Dr. Stanton Kapoor. You don't need to begin physical therapy prior to that visit. You are to participate with [address] [place] [address] as arranged pre-operatively in the convience of your own home.     [x]   Physical therapy will be discussed with you at your first follow-up appointment with Dr. Poly Trimble. You don't need to begin physical therapy prior to that visit. []   If you already have a therapy appointment, please be sure to attend your sessions as scheduled. If you do not have physical therapy scheduled, please call Dr. Poly Trimble' office to set up your first appointment as soon as possible.    []   You do not require Physical Therapy. Important signs and symptoms:    If any of the following signs and symptoms occurs, you should contact Dr. Poly Trimble' office. Please be advised if a problem arises which you feel required immediate medical attention or your are unable to contact Dr. Poly Trimble' office you should seek immediate medical attention. Signs and symptoms to watch for include:  1. A sudden increase in swelling and/or redness or warmth at the area your surgery was performed which isn't relieved by rest, ice and elevation. 2. Oral temperature greater than 101.5 degrees for 12 hours or more which isn't relieved by an increase in fluid intake and taking two Tylenol every 4-6 hours. Do not exceed 3000 mg of Tylenol per day. 3. Excessive drainage from your incisions or drainage that hasn't stopped by 72 hours. 4. Calf pain, tenderness, redness or swelling which isn't relieved with rest and elevation. 5. Fever, chills, shortness of breath, chest pain, nausea, vomiting or other signs and symptoms which are of concern to you. DISCHARGE SUMMARY from Nurse    PATIENT INSTRUCTIONS:    After general anesthesia or intravenous sedation, for 24 hours or while taking prescription Narcotics:  · Limit your activities  · Do not drive and operate hazardous machinery  · Do not make important personal or business decisions  · Do  not drink alcoholic beverages  · If you have not urinated within 8 hours after discharge, please contact your surgeon on call.     Report the following to your surgeon:  · Excessive pain, swelling, redness or odor of or around the surgical area  · Temperature over 100.5  · Nausea and vomiting lasting longer than 4 hours or if unable to take medications  · Any signs of decreased circulation or nerve impairment to extremity: change in color, persistent  numbness, tingling, coldness or increase pain  · Any questions    What to do at Home:  Recommended activity: Activity as tolerated and no driving for today,     If you experience any of the following symptoms as noted above, please follow up with Dr. Johanna Waldron. *  Please give a list of your current medications to your Primary Care Provider. *  Please update this list whenever your medications are discontinued, doses are      changed, or new medications (including over-the-counter products) are added. *  Please carry medication information at all times in case of emergency situations. These are general instructions for a healthy lifestyle:    No smoking/ No tobacco products/ Avoid exposure to second hand smoke  Surgeon General's Warning:  Quitting smoking now greatly reduces serious risk to your health. Obesity, smoking, and sedentary lifestyle greatly increases your risk for illness    A healthy diet, regular physical exercise & weight monitoring are important for maintaining a healthy lifestyle    You may be retaining fluid if you have a history of heart failure or if you experience any of the following symptoms:  Weight gain of 3 pounds or more overnight or 5 pounds in a week, increased swelling in our hands or feet or shortness of breath while lying flat in bed. Please call your doctor as soon as you notice any of these symptoms; do not wait until your next office visit. The discharge information has been reviewed with the patient and caregiver. The patient and caregiver verbalized understanding.   Discharge medications reviewed with the patient and caregiver and appropriate educational materials and side effects teaching were provided.   ___________________________________________________________________________________________________________________________________

## 2022-06-01 NOTE — PERIOP NOTES
TRANSFER - IN REPORT:    Verbal report received from Orval ALVAREZ Aguirre and INOCENCIA Ortiz on [facility]  being received from OR for routine progression of care      Report consisted of patients Situation, Background, Assessment and   Recommendations(SBAR). Information from the following report(s) OR Summary, Procedure Summary, Intake/Output and MAR was reviewed with the receiving nurse. Opportunity for questions and clarification was provided. Assessment completed upon patients arrival to unit and care assumed.

## 2022-06-01 NOTE — PERIOP NOTES
TRANSFER - IN REPORT:    Verbal report received from [address] on [facility]  being received from pacu for routine progression of care      Report consisted of patients Situation, Background, Assessment and   Recommendations(SBAR). Information from the following report(s) SBAR, Kardex and MAR was reviewed with the receiving nurse. Opportunity for questions and clarification was provided. Assessment completed upon patients arrival to unit and care assumed.

## 2022-06-01 NOTE — INTERVAL H&P NOTE
Update History & Physical    The Patient's History and Physical of May 31,   The surgery was reviewed with the patient and I examined the patient. There was no change. The surgical site was confirmed by the patient and me. Plan:  The risk, benefits, expected outcome, and alternative to the recommended procedure have been discussed with the patient. Patient understands and wants to proceed with the procedure.     Electronically signed by Madison Sanchez MD on 6/1/2022 at 12:00 PM

## 2022-06-01 NOTE — PERIOP NOTES
Pt. Used restroom in pre-op area with assistance. Patient placed on Dakotah Paws for a minimum of 30 min in  Preop.

## 2022-06-01 NOTE — ANESTHESIA PREPROCEDURE EVALUATION
Relevant Problems   CARDIOVASCULAR   (+) HTN (hypertension)      GASTROINTESTINAL   (+) GERD (gastroesophageal reflux disease)      ENDOCRINE   (+) Acquired hypothyroidism       Anesthetic History   No history of anesthetic complications            Review of Systems / Medical History  Patient summary reviewed, nursing notes reviewed and pertinent labs reviewed    Pulmonary  Within defined limits                 Neuro/Psych   Within defined limits           Cardiovascular    Hypertension          CAD    Exercise tolerance: >4 METS  Comments: Pacemaker   GI/Hepatic/Renal     GERD           Endo/Other      Hypothyroidism  Arthritis     Other Findings            Physical Exam    Airway  Mallampati: III  TM Distance: 4 - 6 cm  Neck ROM: decreased range of motion   Mouth opening: Normal     Cardiovascular  Regular rate and rhythm,  S1 and S2 normal,  no murmur, click, rub, or gallop  Rhythm: regular  Rate: normal         Dental    Dentition: Full lower dentures and Full upper dentures     Pulmonary  Breath sounds clear to auscultation               Abdominal  GI exam deferred       Other Findings            Anesthetic Plan    ASA: 3  Anesthesia type: general      Post-op pain plan if not by surgeon: peripheral nerve block single    Induction: Intravenous  Anesthetic plan and risks discussed with: Patient      ACB explained. Risks explained including bleeding, infection, nerve injury, failed block. Procedure explained as elective. Pt understands and desires to proceed.

## 2022-06-02 NOTE — OP NOTES
[facility]  OPERATIVE REPORT    Name:  Aurelia Preston  MR#:   [id number]  :  1955  ACCOUNT #:  [de-identified]  DATE OF SERVICE:  2022    PREOPERATIVE DIAGNOSIS:  Right chronic quadriceps tendon rupture. POSTOPERATIVE DIAGNOSIS:  Right chronic quadriceps tendon rupture. PROCEDURE PERFORMED:  Right chronic quadriceps tendon rupture repair. SURGEON:  Marcella Le MD    ASSISTANT:  Guille Palomino PA-C    ANESTHESIA:  General.    COMPLICATIONS:  None. TOURNIQUET TIME:  35 minutes at 350 mmHg. SPECIMENS REMOVED:  none    IMPLANTS:  none    ESTIMATED BLOOD LOSS:  25cc    INDICATIONS:  This is a 79-year-old [facility] American male status post a right quadriceps tendon rupture from 2022. The patient was seen last week and is brought to the operating room for repair. PROCEDURE:  The patient was on the operating theater. After adequate anesthesia, the right knee was prepped and draped in the typical sterile fashion. The knee was exsanguinated with an Esmarch bandage, and the tourniquet inflated to 350 mmHg. A curvilinear incision was then made in the midline above the patella and then curved medially around a small abrasion in the anterior knee and extended down to about the inferior pole of the patella. The incision was deepened down to the quadriceps rupture into the superficial part of the patella. The quadriceps rupture was quite evident, though has migrated proximally 1.5 inch to 2 inches. There was some calcification/patellar fragment still on the distal extent. There was tearing of both the medial and lateral retinaculum. I used pickups and a knife and dissected the proximal patella down to a good bony surface. The rongeur helped clean it up to a good bony surface completely. The patellar tendon was then debrided on its distal extent, removed one part of a small piece of bone, but then I did sharp dissection finding good tendon.   I used two Vinny and Kathe Lo. The stitches were placed on the table. I used a free needle and then used a Krackow stitch over the last 2.5 cm of tendon. I placed one stitch on the lateral extent and the other stitch on the medial extent. Both had good purchase of the quadriceps tendon. I then used the drill and placed three drill holes parallel to one another, one medial, one lateral and one in the central portion. The lateral was drilled first at the midportion of the patella. The Hewson suture retriever was then placed down the hole from proximal to distal and brought out through the patellar tendon. Another #1 Vicryl was folded in half, placed in the [facility] and brought back up the patella, and this allowed easy passage of the Ultratape distally. The same thing was carried out on the medial side and then the Hewson was placed down the central drill hole from proximal to distal and then the two outside stitches were brought up through the middle drill hole. I used two towel clips to hold the tendon reduced to the patella which was under tension and then tied the two Ultratapes as tightly as possible to hold them in position. Once these were tightened, then three alternating half hitches were replaced in alternating post.  The stitches held the tendon with good apposition. I tried flexing the knee some and with any tension, one could heel the pressure and see the tension of the tendon wanting to pull away from the bone. No further testing was performed. The medial and lateral retinaculum were then oversewn with interrupted figure-of-eight #1 Vicryls, and then the anterior soft tissue of the quadriceps was oversewn closing the space nicely above the repair. The wound was then irrigated out completely. The skin was closed with inverted 2-0 Vicryls.   The skin was then pulled together with Mastisol and half-inch Steri-Strips and approximately 30 mL of 0.25% Marcaine with epinephrine was injected in the tendon and in the skin as well as intra-articularly. This was dressed with 4x4s and a cast padding and an Ace wrap from the toes to the mid thigh. He was put in a knee immobilizer in extension, and the tourniquet was deflated and all instrument, sponge and needle counts were correct.       Navi Briones MD      [id number]  D:  06/01/2022 15:24  T:  06/01/2022 22:55  JOB #:  [id number]

## 2024-01-08 ENCOUNTER — OFFICE VISIT (OUTPATIENT)
Age: 69
End: 2024-01-08
Payer: MEDICARE

## 2024-01-08 VITALS — WEIGHT: 210 LBS | HEIGHT: 67 IN | BODY MASS INDEX: 32.96 KG/M2

## 2024-01-08 DIAGNOSIS — M25.561 CHRONIC PAIN OF RIGHT KNEE: ICD-10-CM

## 2024-01-08 DIAGNOSIS — G89.29 CHRONIC PAIN OF RIGHT KNEE: ICD-10-CM

## 2024-01-08 DIAGNOSIS — M25.561 RIGHT KNEE PAIN, UNSPECIFIED CHRONICITY: Primary | ICD-10-CM

## 2024-01-08 DIAGNOSIS — M17.11 PRIMARY OSTEOARTHRITIS OF RIGHT KNEE: ICD-10-CM

## 2024-01-08 PROCEDURE — 1036F TOBACCO NON-USER: CPT | Performed by: ORTHOPAEDIC SURGERY

## 2024-01-08 PROCEDURE — 20611 DRAIN/INJ JOINT/BURSA W/US: CPT | Performed by: ORTHOPAEDIC SURGERY

## 2024-01-08 PROCEDURE — G8417 CALC BMI ABV UP PARAM F/U: HCPCS | Performed by: ORTHOPAEDIC SURGERY

## 2024-01-08 PROCEDURE — 99203 OFFICE O/P NEW LOW 30 MIN: CPT | Performed by: ORTHOPAEDIC SURGERY

## 2024-01-08 PROCEDURE — G8427 DOCREV CUR MEDS BY ELIG CLIN: HCPCS | Performed by: ORTHOPAEDIC SURGERY

## 2024-01-08 PROCEDURE — 1123F ACP DISCUSS/DSCN MKR DOCD: CPT | Performed by: ORTHOPAEDIC SURGERY

## 2024-01-08 PROCEDURE — 3017F COLORECTAL CA SCREEN DOC REV: CPT | Performed by: ORTHOPAEDIC SURGERY

## 2024-01-08 PROCEDURE — G8484 FLU IMMUNIZE NO ADMIN: HCPCS | Performed by: ORTHOPAEDIC SURGERY

## 2024-01-08 RX ORDER — GABAPENTIN 300 MG/1
300 CAPSULE ORAL
Qty: 30 CAPSULE | Refills: 0 | Status: SHIPPED | OUTPATIENT
Start: 2024-01-08 | End: 2024-02-07

## 2024-01-08 RX ORDER — LEVOTHYROXINE SODIUM 112 UG/1
112 TABLET ORAL DAILY
COMMUNITY
Start: 2023-11-30

## 2024-01-08 RX ORDER — LEVETIRACETAM 250 MG/1
500 TABLET ORAL 2 TIMES DAILY
COMMUNITY
Start: 2023-12-23

## 2024-01-08 RX ORDER — LIDOCAINE HYDROCHLORIDE 10 MG/ML
9 INJECTION, SOLUTION INFILTRATION; PERINEURAL ONCE
Status: COMPLETED | OUTPATIENT
Start: 2024-01-08 | End: 2024-01-08

## 2024-01-08 RX ORDER — TRIAMCINOLONE ACETONIDE 40 MG/ML
40 INJECTION, SUSPENSION INTRA-ARTICULAR; INTRAMUSCULAR ONCE
Status: COMPLETED | OUTPATIENT
Start: 2024-01-08 | End: 2024-01-08

## 2024-01-08 RX ORDER — NITROGLYCERIN 0.4 MG/1
0.4 TABLET SUBLINGUAL EVERY 5 MIN PRN
COMMUNITY
Start: 2019-03-27

## 2024-01-08 RX ORDER — CLOPIDOGREL BISULFATE 75 MG/1
75 TABLET ORAL DAILY
COMMUNITY

## 2024-01-08 RX ORDER — TAMSULOSIN HYDROCHLORIDE 0.4 MG/1
0.4 CAPSULE ORAL DAILY
COMMUNITY
Start: 2019-12-04

## 2024-01-08 RX ORDER — EVOLOCUMAB 140 MG/ML
INJECTION, SOLUTION SUBCUTANEOUS
COMMUNITY
Start: 2024-01-03

## 2024-01-08 RX ADMIN — TRIAMCINOLONE ACETONIDE 40 MG: 40 INJECTION, SUSPENSION INTRA-ARTICULAR; INTRAMUSCULAR at 14:47

## 2024-01-08 RX ADMIN — LIDOCAINE HYDROCHLORIDE 9 ML: 10 INJECTION, SOLUTION INFILTRATION; PERINEURAL at 14:47

## 2024-01-08 NOTE — PROGRESS NOTES
Name: Sven Carrera    : 1955     [facility] ORTHOPAEDICS AND SPORTS MEDICINE  [address], [address]  [facility] [address]  Dept: [phone]  Dept Fax: [phone]     Chief Complaint   Patient presents with    Knee Pain        Ht 1.689 m (5' 6.5\")   Wt 95.3 kg (210 lb)   BMI 33.39 kg/m²      Allergies   Allergen Reactions    Bee Venom Nausea And Vomiting and Other (See Comments)     \"Pass out\"    Amoxicillin-Pot Clavulanate Diarrhea     diarrhea        Docosahexaenoic Acid (Dha) Other (See Comments)     Woozy and legs swelled        Current Outpatient Medications   Medication Sig Dispense Refill    levothyroxine (SYNTHROID) 112 MCG tablet Take 1 tablet by mouth daily      levETIRAcetam (KEPPRA) 250 MG tablet Take 2 tablets by mouth 2 times daily      tamsulosin (FLOMAX) 0.4 MG capsule Take 1 capsule by mouth daily      nitroGLYCERIN (NITROSTAT) 0.4 MG SL tablet Place 1 tablet under the tongue every 5 minutes as needed      REPATHA SURECLICK 140 MG/ML SOAJ INJECT 1 MILLILITERS INTO THE SKIN EVERY 14 DAYS      clopidogrel (PLAVIX) 75 MG tablet Take 1 tablet by mouth daily      atorvastatin (LIPITOR) 80 MG tablet Take 1 tablet by mouth daily      famotidine (PEPCID) 40 MG tablet Take 40 mg by mouth daily      meloxicam (MOBIC) 15 MG tablet Take 15 mg by mouth daily      sacubitril-valsartan (ENTRESTO)  MG per tablet Take 1 tablet by mouth 2 times daily       Current Facility-Administered Medications   Medication Dose Route Frequency Provider Last Rate Last Admin    lidocaine 1 % injection 9 mL  9 mL Other Once Chago Howard MD        triamcinolone acetonide (KENALOG-40) injection 40 mg  40 mg Other Once Chago Howard MD          Patient Active Problem List   Diagnosis    GERD (gastroesophageal reflux disease)    High cholesterol    HTN (hypertension)    History of MI (myocardial infarction)    Acquired hypothyroidism    Quadriceps

## 2024-01-23 ENCOUNTER — HOSPITAL ENCOUNTER (OUTPATIENT)
Age: 69
Setting detail: RECURRING SERIES
Discharge: HOME | End: 2024-01-26
Payer: MEDICARE

## 2024-01-23 PROCEDURE — 97162 PT EVAL MOD COMPLEX 30 MIN: CPT

## 2024-01-23 NOTE — THERAPY EVALUATION
KNEE EVAL/ PT DAILY TREATMENT NOTE 10-18    Patient Name: Sven Carrera  Date:2024  : 1955  [x]  Patient  Verified  Payor: MEDICARE / Plan: MEDICARE PART A AND B / Product Type: *No Product type* /    In time:1000  Out time:1038  Total Treatment Time (min): 38  Visit #: 1    Medicare/BCBS Only   Total Timed Codes (min):  0 1:1 Treatment Time:  38     Treatment Area: Pain in right knee [M25.561]    SUBJECTIVE  Pt reports having R knee pain for almost 2 years. Pt with history of R quad rupture repair in 2022 by Dr. Aguillon after falling on concrete in 2022. Pt states that Dr. Howard gave him Gabapentin and has helped with nerve pain, states that TKA may be in his future. Pt also had an injection in R knee. Pt reports 50% improvement in pain over past several weeks. Pt is independent with ADLs, drives self and works doing yard work and gardening when the weather is warmer. Pt reports difficulty with walking up and down steps. Pt lives at home with wife in a 2 story home with railing present at steps. Pt denies any recent falls and does not rely on AD.       Pt. Goals: \"strengthen this leg so I can go up and down steps\"    Pain Level (0-10 scale): Current: 2/10 nagging, ache  Worst: 4/10  []constant []intermittent [x]improving []worsening []no change since onset      Past Medical History:   Diagnosis Date    Arthritis     CAD (coronary artery disease)     mi     Chronic pain     back    GERD (gastroesophageal reflux disease)     Hypercholesteremia     Hypertension     Thyroid disease     hyper     Past Surgical History:   Procedure Laterality Date    KNEE ARTHROSCOPY Bilateral     ORTHOPEDIC SURGERY      neck fused    ORTHOPEDIC SURGERY      back fused     PACEMAKER      [employer] left chest defib    SHOULDER ARTHROSCOPY      tear    VASCULAR SURGERY      3 stents     Any medication changes, allergies to medications, adverse drug reactions, diagnosis change, or new

## 2024-01-23 NOTE — THERAPY EVALUATION
[facility]  PHYSICAL THERAPY  [facility], [address] [place]  [address]  Phone: [phone]    Fax: [phone]     PLAN OF CARE / STATEMENT OF MEDICAL NECESSITY FOR PHYSICAL THERAPY SERVICES  Patient Name: Sven Carrera : 1955   Medical   Diagnosis: Pain in right knee [M25.561] Treatment Diagnosis: Right knee pain   Onset Date:      Referral Source: Chago Howard MD Start of Care (SOC): 2024   Prior Hospitalization: See medical history Provider #: [id number]   Prior Level of Function: Independent with pain   Comorbidities: Arthritis, CAD, MI (), GERD, Hypercholesteremia, HTN, Thyroid disease, Neck fusion, Back fusion, B knee arthroscopy, R quad tendon repair ()   Medications: Verified on Patient Summary List   The Plan of Care and following information is based on the information from the initial evaluation.   ==========================================================================================  Assessment / Functional Analysis:    Pt is a 68 y.o. year old  male who presents to outpatient clinic today with chief complaint of persistent R knee pain x 2 years related to quad tendon rupture and repair. Pt is an independent ambulator and presents with impairments that include decreased R knee AROM, decreased hip extension AROM bilaterally, R knee extension weakness, B hip weakness, decreased unilateral stability, decreased functional endurance & pain limiting function. Pt will benefit from skilled PT intervention to target deficits in effort to maximize pain-free daily activity and restore function in home, community & with hobbies.   ==========================================================================================  Eval Complexity: History: MEDIUM  Complexity : 1-2 comorbidities / personal factors will impact the outcome/ POC Exam:MEDIUM Complexity : 3 Standardized tests and measures addressin body

## 2024-01-30 ENCOUNTER — HOSPITAL ENCOUNTER (OUTPATIENT)
Age: 69
Setting detail: RECURRING SERIES
Discharge: HOME | End: 2024-02-02
Payer: MEDICARE

## 2024-01-30 PROCEDURE — 97110 THERAPEUTIC EXERCISES: CPT

## 2024-01-30 NOTE — PROGRESS NOTES
PT DAILY TREATMENT NOTE 8    Patient Name: Sven Carrera  Date:2024  : 1955  [x]  Patient  Verified  Payor: MEDICARE / Plan: MEDICARE PART A AND B / Product Type: *No Product type* /    In time:1030  Out time:1114  Total Treatment Time (min): 44  Total Timed Codes (min): 44  1:1 Treatment Time (min): 44   Visit #: 2     Treatment Area: Pain in right knee [M25.561]    SUBJECTIVE  Pt reported no pain on arrival today.    Pain Level (0-10 scale): 0    Any medication changes, allergies to medications, adverse drug reactions, diagnosis change, or new procedure performed?: [x] No    [] Yes (see summary sheet for update)        OBJECTIVE  Modality rationale: NA today   Min Type Additional Details    [] Estim: []Att   []Unatt  []TENS instruct                 []IFC  []Premod []NMES                       []Other:  []w/US   []w/ice   []w/heat  Position:  Location:    []  Traction: [] Cervical       []Lumbar                       [] Prone          []Supine                       []Intermittent   []Continuous Lbs:  [] before manual  [] after manual    []  Ultrasound: []Continuous   [] Pulsed                           []1MHz   []3MHz Location:  W/cm2:    []  Iontophoresis with dexamethasone         Location: [] Take home patch   [] In clinic    []  Ice     []  heat  []  Ice massage Position:  Location:    []  Vasopneumatic Device Pressure: [] lo [] med [] hi   Temp: [] lo [] med [] hi   [] Skin assessment post-treatment:  []intact []redness- no adverse reaction       []redness - adverse reaction:           44 min Therapeutic Exercise:  [] See flow sheet :   Rationale: increase ROM, increase strength, and improve coordination to improve the patient’s ability to return to prior level of function before injury/illness with reduced pain, achieving optimal strength and function to perform household tasks, daily activities, and return to community events, and/or work.        min Therapeutic Activity:  []  See

## 2024-02-01 ENCOUNTER — HOSPITAL ENCOUNTER (OUTPATIENT)
Age: 69
Setting detail: RECURRING SERIES
Discharge: HOME | End: 2024-02-04
Payer: MEDICARE

## 2024-02-01 PROCEDURE — 97110 THERAPEUTIC EXERCISES: CPT

## 2024-02-01 NOTE — PROGRESS NOTES
sheet :   Rationale:       min Neuromuscular Re-education:  []  See flow sheet :   Rationale:      min Manual Therapy:     Rationale:      min Gait Training:  ___ feet with ___ device on level surfaces with ___ level of assist   Rationale:           With TE  NAY   NR  GT   Mis Patient Education: [x] Review HEP    [] Progressed/Changed HEP based on:   [] positioning   [] body mechanics   [] transfers   [] heat/ice application          Pain Level (0-10 scale) post treatment: 0    ASSESSMENT/Changes in Function:  Session began with warm up on stepper today, followed by HSS/Gastroc  on slant box, heel slides, QS, [place] with anthony disc for added challenge, calf raises, TKEs, and rectus femoris stretch, and introduced step up front and lateral.  Pt needed no modalities. Plan to progress as able, no increased pain with exercises. Cont POC.     Patient will continue to benefit from skilled PT services to modify and progress therapeutic interventions, analyze and address functional mobility deficits, analyze and address ROM deficits, and analyze and address strength deficits to attain remaining goals.       [x]  See Plan of Care  []  See progress note/recertification  []  See Discharge Summary           PLAN  [x]  Upgrade activities as tolerated     [x]  Continue plan of care  []  Update interventions per flow sheet       []  Discharge due to:_  []  Other:_      Cyn Gatica PTA , STEFFI 2/1/2024  2:20 PM

## 2024-02-05 ENCOUNTER — HOSPITAL ENCOUNTER (OUTPATIENT)
Age: 69
Setting detail: RECURRING SERIES
Discharge: HOME | End: 2024-02-08
Payer: MEDICARE

## 2024-02-05 PROCEDURE — 97110 THERAPEUTIC EXERCISES: CPT

## 2024-02-05 NOTE — PROGRESS NOTES
PT DAILY TREATMENT NOTE     Patient Name: Sven Carrera  Date:2024  : 1955  [x]  Patient  Verified  Payor: MEDICARE / Plan: MEDICARE PART A AND B / Product Type: *No Product type* /    In time:1400  Out time:1446  Total Treatment Time (min): 46  Total Timed Codes (min): 46  1:1 Treatment Time (min): 46   Visit #: 4     Treatment Area: Pain in right knee [M25.561]    SUBJECTIVE  Pt reports nerve pain is getting better and he is able to sleep at night.    Pain Level (0-10 scale): 0    Any medication changes, allergies to medications, adverse drug reactions, diagnosis change, or new procedure performed?: [x] No    [] Yes (see summary sheet for update)    OBJECTIVE  Modality rationale: Declined   Min Type Additional Details    [] Estim: []Att   []Unatt  []TENS instruct                 []IFC  []Premod   []NMES                       []Other:  []w/US   []w/ice   []w/heat  Position:  Location:    []  Traction: [] Cervical       []Lumbar                       [] Prone          []Supine                       []Intermittent   []Continuous Lbs:  [] before manual  [] after manual    []  Ultrasound: []Continuous   [] Pulsed                           []1MHz   []3MHz Location:  W/cm2:    []  Iontophoresis with dexamethasone         Location: [] Take home patch   [] In clinic    []  Ice     []  heat  []  Ice massage Position:  Location:    []  Vasopneumatic Device Pressure: [] lo [] med [] hi   Temp: [] lo [] med [] hi   [] Skin assessment post-treatment:  []intact []redness- no adverse reaction       []redness - adverse reaction:     46 min Therapeutic Exercise:  [] See flow sheet :   Rationale: increase ROM, increase strength, improve coordination, improve balance, and increase proprioception to improve the patient’s ability to return to PLOF while remaining pain free.            With TE  TA   NR  GT   Misc Patient Education: [x] Review HEP    [] Progressed/Changed HEP based on:   [] positioning   []

## 2024-02-06 ENCOUNTER — APPOINTMENT (OUTPATIENT)
Age: 69
End: 2024-02-06
Payer: MEDICARE

## 2024-02-06 DIAGNOSIS — M25.561 RIGHT KNEE PAIN, UNSPECIFIED CHRONICITY: ICD-10-CM

## 2024-02-06 DIAGNOSIS — G89.29 CHRONIC PAIN OF RIGHT KNEE: ICD-10-CM

## 2024-02-06 DIAGNOSIS — M25.561 CHRONIC PAIN OF RIGHT KNEE: ICD-10-CM

## 2024-02-06 RX ORDER — GABAPENTIN 300 MG/1
CAPSULE ORAL
Qty: 30 CAPSULE | Refills: 0 | Status: SHIPPED | OUTPATIENT
Start: 2024-02-06 | End: 2024-03-07

## 2024-02-08 ENCOUNTER — HOSPITAL ENCOUNTER (OUTPATIENT)
Age: 69
Setting detail: RECURRING SERIES
Discharge: HOME | End: 2024-02-11
Payer: MEDICARE

## 2024-02-08 PROCEDURE — 97110 THERAPEUTIC EXERCISES: CPT

## 2024-02-08 NOTE — PROGRESS NOTES
PT DAILY TREATMENT NOTE     Patient Name: Sven Carrera  Date:2024  : 1955  [x]  Patient  Verified  Payor: MEDICARE / Plan: MEDICARE PART A AND B / Product Type: *No Product type* /    In time:1041  Out time:1136  Total Treatment Time (min): 55  Total Timed Codes (min): 55  1:1 Treatment Time (min): 55  Visit #: 5    Treatment Area: Pain in right knee [M25.561]    SUBJECTIVE  Pt reported no pain on arrival today.    Pain Level (0-10 scale): 0    Any medication changes, allergies to medications, adverse drug reactions, diagnosis change, or new procedure performed?: [x] No    [] Yes (see summary sheet for update)        OBJECTIVE  Modality rationale: NA today   Min Type Additional Details    [] Estim: []Att   []Unatt  []TENS instruct                 []IFC  []Premod []NMES                       []Other:  []w/US   []w/ice   []w/heat  Position:  Location:    []  Traction: [] Cervical       []Lumbar                       [] Prone          []Supine                       []Intermittent   []Continuous Lbs:  [] before manual  [] after manual    []  Ultrasound: []Continuous   [] Pulsed                           []1MHz   []3MHz Location:  W/cm2:    []  Iontophoresis with dexamethasone         Location: [] Take home patch   [] In clinic    []  Ice     []  heat  []  Ice massage Position:  Location:    []  Vasopneumatic Device Pressure: [] lo [] med [] hi   Temp: [] lo [] med [] hi   [] Skin assessment post-treatment:  []intact []redness- no adverse reaction       []redness - adverse reaction:           55 min Therapeutic Exercise:  [] See flow sheet :   Rationale: increase ROM, increase strength, and improve coordination to improve the patient’s ability to return to prior level of function before injury/illness with reduced pain, achieving optimal strength and function to perform household tasks, daily activities, and return to community events, and/or work.        min Therapeutic Activity:  []  See flow

## 2024-02-13 ENCOUNTER — HOSPITAL ENCOUNTER (OUTPATIENT)
Age: 69
Setting detail: RECURRING SERIES
Discharge: HOME | End: 2024-02-16
Payer: MEDICARE

## 2024-02-13 PROCEDURE — 97110 THERAPEUTIC EXERCISES: CPT

## 2024-02-13 NOTE — PROGRESS NOTES
PT DAILY TREATMENT NOTE     Patient Name: Sven Carrera  Date:2024  : 1955  [x]  Patient  Verified  Payor: MEDICARE / Plan: MEDICARE PART A AND B / Product Type: *No Product type* /    In time:1030  Out time:1124  Total Treatment Time (min): 54  Total Timed Codes (min): 54  1:1 Treatment Time (min):54   Visit #: 6    Treatment Area: Pain in right knee [M25.561]    SUBJECTIVE  Pt reportedno new c/o today but stated that this past rainy weekend was bad for his knee.    Pain Level (0-10 scale): 0    Any medication changes, allergies to medications, adverse drug reactions, diagnosis change, or new procedure performed?: [x] No    [] Yes (see summary sheet for update)        OBJECTIVE  Modality rationale: NA today   Min Type Additional Details    [] Estim: []Att   []Unatt  []TENS instruct                 []IFC  []Premod []NMES                       []Other:  []w/US   []w/ice   []w/heat  Position:  Location:    []  Traction: [] Cervical       []Lumbar                       [] Prone          []Supine                       []Intermittent   []Continuous Lbs:  [] before manual  [] after manual    []  Ultrasound: []Continuous   [] Pulsed                           []1MHz   []3MHz Location:  W/cm2:    []  Iontophoresis with dexamethasone         Location: [] Take home patch   [] In clinic    []  Ice     []  heat  []  Ice massage Position:  Location:    []  Vasopneumatic Device Pressure: [] lo [] med [] hi   Temp: [] lo [] med [] hi   [] Skin assessment post-treatment:  []intact []redness- no adverse reaction       []redness - adverse reaction:           54 min Therapeutic Exercise:  [] See flow sheet :   Rationale: increase ROM, increase strength, and improve coordination to improve the patient’s ability to return to prior level of function before injury/illness with reduced pain, achieving optimal strength and function to perform household tasks, daily activities, and return to community events, and/or

## 2024-02-15 ENCOUNTER — HOSPITAL ENCOUNTER (OUTPATIENT)
Age: 69
Setting detail: RECURRING SERIES
Discharge: HOME | End: 2024-02-18
Payer: MEDICARE

## 2024-02-15 PROCEDURE — 97110 THERAPEUTIC EXERCISES: CPT

## 2024-02-15 NOTE — PROGRESS NOTES
PT DAILY TREATMENT NOTE     Patient Name: Sven Carrera  Date:2/15/2024  : 1955  [x]  Patient  Verified  Payor: MEDICARE / Plan: MEDICARE PART A AND B / Product Type: *No Product type* /    In time:1032  Out time:1129  Total Treatment Time (min): 54  Total Timed Codes (min): 54  1:1 Treatment Time (min):54   Visit #: 6    Treatment Area: Pain in right knee [M25.561]    SUBJECTIVE  Pt reportedno new c/o today but stated that this past rainy weekend was bad for his knee. Pt had c/o soreness in anterior thigh/knee on R.    Pain Level (0-10 scale): 0    Any medication changes, allergies to medications, adverse drug reactions, diagnosis change, or new procedure performed?: [x] No    [] Yes (see summary sheet for update)        OBJECTIVE  Modality rationale: NA today   Min Type Additional Details    [] Estim: []Att   []Unatt  []TENS instruct                 []IFC  []Premod []NMES                       []Other:  []w/US   []w/ice   []w/heat  Position:  Location:    []  Traction: [] Cervical       []Lumbar                       [] Prone          []Supine                       []Intermittent   []Continuous Lbs:  [] before manual  [] after manual    []  Ultrasound: []Continuous   [] Pulsed                           []1MHz   []3MHz Location:  W/cm2:    []  Iontophoresis with dexamethasone         Location: [] Take home patch   [] In clinic    []  Ice     []  heat  []  Ice massage Position:  Location:    []  Vasopneumatic Device Pressure: [] lo [] med [] hi   Temp: [] lo [] med [] hi   [] Skin assessment post-treatment:  []intact []redness- no adverse reaction       []redness - adverse reaction:           57 min Therapeutic Exercise:  [] See flow sheet :   Rationale: increase ROM, increase strength, and improve coordination to improve the patient’s ability to return to prior level of function before injury/illness with reduced pain, achieving optimal strength and function to perform household tasks, daily

## 2024-02-20 ENCOUNTER — HOSPITAL ENCOUNTER (OUTPATIENT)
Age: 69
Setting detail: RECURRING SERIES
Discharge: HOME | End: 2024-02-23
Payer: MEDICARE

## 2024-02-20 PROCEDURE — 97110 THERAPEUTIC EXERCISES: CPT

## 2024-02-20 NOTE — PROGRESS NOTES
PT DAILY TREATMENT NOTE 8    Patient Name: Sven Carrera  Date:2024  : 1955  [x]  Patient  Verified  Payor: MEDICARE / Plan: MEDICARE PART A AND B / Product Type: *No Product type* /    In time:1028 Out time:1117  Total Treatment Time (min): 49  Total Timed Codes (min):49  1:1 Treatment Time (min): 49  Visit #: 8    Treatment Area: Pain in right knee [M25.561]    SUBJECTIVE  Pt reportedno new c/o today but stated that he was sore after last session.    Pain Level (0-10 scale): 0    Any medication changes, allergies to medications, adverse drug reactions, diagnosis change, or new procedure performed?: [x] No    [] Yes (see summary sheet for update)        OBJECTIVE  Modality rationale: NA today   Min Type Additional Details    [] Estim: []Att   []Unatt  []TENS instruct                 []IFC  []Premod []NMES                       []Other:  []w/US   []w/ice   []w/heat  Position:  Location:    []  Traction: [] Cervical       []Lumbar                       [] Prone          []Supine                       []Intermittent   []Continuous Lbs:  [] before manual  [] after manual    []  Ultrasound: []Continuous   [] Pulsed                           []1MHz   []3MHz Location:  W/cm2:    []  Iontophoresis with dexamethasone         Location: [] Take home patch   [] In clinic    []  Ice     []  heat  []  Ice massage Position:  Location:    []  Vasopneumatic Device Pressure: [] lo [] med [] hi   Temp: [] lo [] med [] hi   [] Skin assessment post-treatment:  []intact []redness- no adverse reaction       []redness - adverse reaction:           49 min Therapeutic Exercise:  [] See flow sheet :   Rationale: increase ROM, increase strength, and improve coordination to improve the patient’s ability to return to prior level of function before injury/illness with reduced pain, achieving optimal strength and function to perform household tasks, daily activities, and return to community events, and/or work.        min

## 2024-02-22 ENCOUNTER — HOSPITAL ENCOUNTER (OUTPATIENT)
Age: 69
Setting detail: RECURRING SERIES
Discharge: HOME | End: 2024-02-25
Payer: MEDICARE

## 2024-02-22 PROCEDURE — 97110 THERAPEUTIC EXERCISES: CPT

## 2024-02-22 NOTE — PROGRESS NOTES
PT DAILY TREATMENT NOTE 8-14    Patient Name: Sven Carrera  Date:2024  : 1955  [x]  Patient  Verified  Payor: MEDICARE / Plan: MEDICARE PART A AND B / Product Type: *No Product type* /    In time:1032  Out time:1134  Total Treatment Time (min): 62  Total Timed Codes (min): 62  1:1 Treatment Time (min): 62   Visit #: 9     Treatment Area: Pain in right knee [M25.561]    SUBJECTIVE  Pt reports no issues concerning R knee.     Pain Level (0-10 scale): 0    Any medication changes, allergies to medications, adverse drug reactions, diagnosis change, or new procedure performed?: [x] No    [] Yes (see summary sheet for update)    OBJECTIVE  Modality rationale: Declined   Min Type Additional Details    [] Estim: []Att   []Unatt  []TENS instruct                 []IFC  []Premod   []NMES                       []Other:  []w/US   []w/ice   []w/heat  Position:  Location:    []  Traction: [] Cervical       []Lumbar                       [] Prone          []Supine                       []Intermittent   []Continuous Lbs:  [] before manual  [] after manual    []  Ultrasound: []Continuous   [] Pulsed                           []1MHz   []3MHz Location:  W/cm2:    []  Iontophoresis with dexamethasone         Location: [] Take home patch   [] In clinic    []  Ice     []  heat  []  Ice massage Position:  Location:    []  Vasopneumatic Device Pressure: [] lo [] med [] hi   Temp: [] lo [] med [] hi   [] Skin assessment post-treatment:  []intact []redness- no adverse reaction       []redness - adverse reaction:     49 min Therapeutic Exercise:  [] See flow sheet :   Rationale: increase ROM, increase strength, improve coordination, improve balance, and increase proprioception to improve the patient’s ability to complete work and leisure activities while remaining pain free.            With TE  TA   NR  GT   Misc Patient Education: [x] Review HEP    [] Progressed/Changed HEP based on:   [] positioning   [] body

## 2024-02-26 ENCOUNTER — APPOINTMENT (OUTPATIENT)
Age: 69
End: 2024-02-26
Payer: MEDICARE

## 2024-02-27 ENCOUNTER — HOSPITAL ENCOUNTER (OUTPATIENT)
Age: 69
Setting detail: RECURRING SERIES
Discharge: HOME | End: 2024-03-01
Payer: MEDICARE

## 2024-02-27 PROCEDURE — 97110 THERAPEUTIC EXERCISES: CPT

## 2024-02-27 NOTE — THERAPY DISCHARGE
[facility]ILIT[facility]  PHYSICAL THERAPY  [address], [address] * Phone: [phone] * Fax: [phone]  DISCHARGE SUMMARY FOR PHYSICAL THERAPY            Patient Name: Sven Carrera : 1955   Treatment/Medical Diagnosis: Pain in right knee [M25.561]   Onset Date:     Referral Source: Chago Howard MD Start of Care (SOC): 24   Prior Hospitalization: See Medical History Provider #: [id number]   Prior Level of Function: Independent with pain   Comorbidities: Arthritis, CAD, MI (), GERD, Hypercholesteremia, HTN, Thyroid disease, Neck fusion, Back fusion, B knee arthroscopy, R quad tendon repair ()    Medications: Verified on Patient Summary List   Visits from SOC: 10 Missed Visits: 0       Subjective:  Pt without complaints upon arrival today. Pt states that going down steps is most difficult at this time.     Objective:  Palpation: tenderness most noted along distal quadriceps; intact to light touch; visible atrophy        ROM / Strength  [] Unable to assess                  AROM                         PROM                     Strength       Left Right Left Right Left Right   Hip Flexion         5/5 5/5     Extension <WFL <WFL     4+/5 4+/5     Abduction         4+/5 4+/5     Adduction               Knee Flexion 128 116   125 5/5 5/5     Extension 0 0   0 5/5 5/5   Ankle Plantarflexion         5/5 5/5     Dorsiflexion         5/5 5/5         Patellar Mobility:     Hypermobile:       [] Left   [] Right         Hypomobile:   [] Left   [x] Right     Gait:                [] Normal    [] Abnormal    [x] Antalgic    [] NWB    Device: no AD                          Distance: decreased halle  Comments: steps via reciprocal pattern using B handrails, pain with descent        Balance: SLS: 10 seconds on left, 5 seconds on right     Other tests/comments: FOTO: 62  (initial), 50 (today)  LEFS: 52.2 (initial), 36.1 (today)  30 second STS

## 2024-02-27 NOTE — PROGRESS NOTES
PT DAILY TREATMENT NOTE 8-14    Patient Name: Sven Carrera  Date:2024  : 1955  [x]  Patient  Verified  Payor: MEDICARE / Plan: MEDICARE PART A AND B / Product Type: *No Product type* /    In time:1356  Out time:1439  Total Treatment Time (min): 43  Total Timed Codes (min): 43  1:1 Treatment Time (min): 43   Visit # 10      Treatment Area: Pain in right knee [M25.561]    SUBJECTIVE  Pt without complaints upon arrival today.  Pain Level (0-10 scale): 0/10  Any medication changes, allergies to medications, adverse drug reactions, diagnosis change, or new procedure performed?: [x] No    [] Yes (see summary sheet for update)        OBJECTIVE    43 min Therapeutic Exercise:  [x] See flow sheet :   Rationale: increase ROM, increase strength, and improve coordination to improve the patient’s ability to maximize pain-free daily activity and function in home & community              With TE  TA   NR  [facility] Patient Education: [x] Review HEP    [] Progressed/Changed HEP based on:   [] positioning   [] body mechanics   [] transfers   [] heat/ice application          Pain Level (0-10 scale) post treatment: 0/10    ASSESSMENT/Changes in Function: Pt seen today for reassessment of knee ROM, LE strength , functional endurance & standing balance. All personal goals achieved at this time. Pt opts for discharge to [facility] at this time.     []  See Plan of Care  []  See progress note/recertification  [x]  See Discharge Summary             PLAN  []  Upgrade activities as tolerated     []  Continue plan of care  []  Update interventions per flow sheet       [x]  Discharge due to: personal & functional goals achieved  []  Other:_      Rianna Maria, PT, DPT 2024  1:56 PM

## 2024-02-29 ENCOUNTER — APPOINTMENT (OUTPATIENT)
Age: 69
End: 2024-02-29
Payer: MEDICARE

## 2024-03-02 DIAGNOSIS — M25.561 CHRONIC PAIN OF RIGHT KNEE: ICD-10-CM

## 2024-03-02 DIAGNOSIS — M25.561 RIGHT KNEE PAIN, UNSPECIFIED CHRONICITY: ICD-10-CM

## 2024-03-02 DIAGNOSIS — G89.29 CHRONIC PAIN OF RIGHT KNEE: ICD-10-CM

## 2024-03-02 RX ORDER — GABAPENTIN 300 MG/1
CAPSULE ORAL
Qty: 30 CAPSULE | Refills: 0 | Status: SHIPPED | OUTPATIENT
Start: 2024-03-02 | End: 2024-04-01

## 2024-04-13 DIAGNOSIS — G89.29 CHRONIC PAIN OF RIGHT KNEE: ICD-10-CM

## 2024-04-13 DIAGNOSIS — M25.561 RIGHT KNEE PAIN, UNSPECIFIED CHRONICITY: ICD-10-CM

## 2024-04-13 DIAGNOSIS — M25.561 CHRONIC PAIN OF RIGHT KNEE: ICD-10-CM

## 2024-04-15 RX ORDER — GABAPENTIN 300 MG/1
CAPSULE ORAL
Qty: 30 CAPSULE | Refills: 0 | Status: SHIPPED | OUTPATIENT
Start: 2024-04-15 | End: 2024-05-15

## 2024-12-12 ENCOUNTER — OFFICE VISIT (OUTPATIENT)
Age: 69
End: 2024-12-12

## 2024-12-12 DIAGNOSIS — M25.511 RIGHT SHOULDER PAIN, UNSPECIFIED CHRONICITY: Primary | ICD-10-CM

## 2024-12-12 DIAGNOSIS — M19.011 ARTHRITIS OF RIGHT SHOULDER REGION: ICD-10-CM

## 2024-12-12 RX ORDER — TRIAMCINOLONE ACETONIDE 40 MG/ML
40 INJECTION, SUSPENSION INTRA-ARTICULAR; INTRAMUSCULAR ONCE
Status: COMPLETED | OUTPATIENT
Start: 2024-12-12 | End: 2024-12-12

## 2024-12-12 RX ORDER — LIDOCAINE HYDROCHLORIDE 10 MG/ML
9 INJECTION, SOLUTION INFILTRATION; PERINEURAL ONCE
Status: COMPLETED | OUTPATIENT
Start: 2024-12-12 | End: 2024-12-12

## 2024-12-12 RX ADMIN — LIDOCAINE HYDROCHLORIDE 9 ML: 10 INJECTION, SOLUTION INFILTRATION; PERINEURAL at 09:53

## 2024-12-12 RX ADMIN — TRIAMCINOLONE ACETONIDE 40 MG: 40 INJECTION, SUSPENSION INTRA-ARTICULAR; INTRAMUSCULAR at 09:54

## 2024-12-12 NOTE — PROGRESS NOTES
MI (myocardial infarction)    Acquired hypothyroidism    Quadriceps tendon rupture, right, initial encounter      Family History   Problem Relation Age of Onset    Heart Disease Mother     No Known Problems Father        Past Surgical History:   Procedure Laterality Date    KNEE ARTHROSCOPY Bilateral     ORTHOPEDIC SURGERY      neck fused    ORTHOPEDIC SURGERY      back fused     PACEMAKER      [employer] left chest defib    SHOULDER ARTHROSCOPY      tear    VASCULAR SURGERY      3 stents      Past Medical History:   Diagnosis Date    Arthritis     CAD (coronary artery disease)     mi 2004    Chronic pain     back    GERD (gastroesophageal reflux disease)     Hypercholesteremia     Hypertension 2000    Thyroid disease     hyper        I have reviewed and agree with PFSH and ROS and intake form in chart and the record furthermore I have reviewed prior medical record(s) regarding this patients care during this appointment.     Review of Systems:   Patient is a pleasant appearing individual, appropriately dressed, well hydrated, well nourished, who is alert, appropriately oriented for age, and in no acute distress with a normal gait and normal affect who does not appear to be in any significant pain.     Physical Exam:  Right Shoulder - Grossly neurovascularly intact. Range of motion-Decreased actively and passively. No Point tenderness, Strength-weakness with abduction, positive crepitation, No skin lesion are identified, No instabilty is noted, Positive apprehension, No Swelling.     Left Shoulder - Grossly neurovascularly intact, Full Range of motion, No point tenderness, No weakness, No skin lesions, No Instability, No apprehension, No swelling.    Procedure Documentation:    I discussed in detail the risks, benefits and complications of an injection which included but are not limited to infection, skin reactions, hot swollen joint, and anaphylaxis with the patient. The patient verbalized understanding and

## 2025-01-07 DIAGNOSIS — M25.511 RIGHT SHOULDER PAIN, UNSPECIFIED CHRONICITY: Primary | ICD-10-CM

## 2025-01-07 DIAGNOSIS — M19.011 ARTHRITIS OF RIGHT SHOULDER REGION: ICD-10-CM

## 2025-01-09 ENCOUNTER — HOSPITAL ENCOUNTER (OUTPATIENT)
Age: 70
Discharge: HOME | End: 2025-01-12
Attending: ORTHOPAEDIC SURGERY
Payer: MEDICARE

## 2025-01-09 DIAGNOSIS — M19.011 ARTHRITIS OF RIGHT SHOULDER REGION: ICD-10-CM

## 2025-01-09 DIAGNOSIS — M25.511 RIGHT SHOULDER PAIN, UNSPECIFIED CHRONICITY: ICD-10-CM

## 2025-01-09 PROCEDURE — 73200 CT UPPER EXTREMITY W/O DYE: CPT

## 2025-01-16 ENCOUNTER — OFFICE VISIT (OUTPATIENT)
Age: 70
End: 2025-01-16
Payer: MEDICARE

## 2025-01-16 DIAGNOSIS — M19.011 PRIMARY OSTEOARTHRITIS OF RIGHT SHOULDER: ICD-10-CM

## 2025-01-16 DIAGNOSIS — M25.511 RIGHT SHOULDER PAIN, UNSPECIFIED CHRONICITY: Primary | ICD-10-CM

## 2025-01-16 PROCEDURE — 3017F COLORECTAL CA SCREEN DOC REV: CPT | Performed by: ORTHOPAEDIC SURGERY

## 2025-01-16 PROCEDURE — G8421 BMI NOT CALCULATED: HCPCS | Performed by: ORTHOPAEDIC SURGERY

## 2025-01-16 PROCEDURE — G8427 DOCREV CUR MEDS BY ELIG CLIN: HCPCS | Performed by: ORTHOPAEDIC SURGERY

## 2025-01-16 PROCEDURE — 99213 OFFICE O/P EST LOW 20 MIN: CPT | Performed by: ORTHOPAEDIC SURGERY

## 2025-01-16 PROCEDURE — 1123F ACP DISCUSS/DSCN MKR DOCD: CPT | Performed by: ORTHOPAEDIC SURGERY

## 2025-01-16 PROCEDURE — 1036F TOBACCO NON-USER: CPT | Performed by: ORTHOPAEDIC SURGERY

## 2025-01-16 NOTE — PROGRESS NOTES
Mother     No Known Problems Father        Past Surgical History:   Procedure Laterality Date    KNEE ARTHROSCOPY Bilateral     ORTHOPEDIC SURGERY      neck fused    ORTHOPEDIC SURGERY      back fused     PACEMAKER      [employer] left chest defib    SHOULDER ARTHROSCOPY      tear    VASCULAR SURGERY      3 stents      Past Medical History:   Diagnosis Date    Arthritis     CAD (coronary artery disease)     mi 2004    Chronic pain     back    GERD (gastroesophageal reflux disease)     Hypercholesteremia     Hypertension 2000    Thyroid disease     hyper        I have reviewed and agree with PFSH and ROS and intake form in chart and the record furthermore I have reviewed prior medical record(s) regarding this patients care during this appointment.     Review of Systems:   Patient is a pleasant appearing individual, appropriately dressed, well hydrated, well nourished, who is alert, appropriately oriented for age, and in no acute distress with a normal gait and normal affect who does not appear to be in any significant pain.     History of Present Illness  The patient is a 69-year-old male who presents for evaluation of shoulder pain.    He has been experiencing persistent discomfort in his shoulder, which was exacerbated by an incident in April 2024. He reports that the pain is manageable as long as he refrains from elevating his arm. 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 A cortisone injection administered previously provided temporary relief.    Physical Exam:  Right Shoulder - Grossly neurovascularly intact. Range of motion-Decreased actively and passively. No Point tenderness, Strength-weakness with abduction, positive crepitation, No skin lesion are identified, No instabilty is noted, Positive apprehension, No Swelling.     Left Shoulder - Grossly neurovascularly intact, Full Range of

## (undated) DEVICE — UNDERCAST PADDING: Brand: DEROYAL

## (undated) DEVICE — ULTRATAPE SUTURE COBRAID BLUE, 6                                    PER BOX: Brand: ULTRATAPE

## (undated) DEVICE — SOL IRRIGATION INJ NACL 0.9% 500ML BTL

## (undated) DEVICE — OPTIFOAM GENTLE LITE, BORDERED, 4X4: Brand: MEDLINE

## (undated) DEVICE — PACK PROCEDURE SURG TOT KNEE CUST

## (undated) DEVICE — BANDAGE COMPR W6INXL10YD ST M E WHITE/BEIGE

## (undated) DEVICE — ZIMMER® STERILE DISPOSABLE TOURNIQUET CUFF WITH PROTECTIVE SLEEVE AND PLC, SINGLE PORT, SINGLE BLADDER, 34 IN. (86 CM)

## (undated) DEVICE — SUT VCRL + 1 36IN CT1 VIO --

## (undated) DEVICE — SUT VCRL + 2-0 36IN CT1 UD --

## (undated) DEVICE — GOWN,AURORA,NONRNF,XL,30/CS: Brand: MEDLINE

## (undated) DEVICE — GLOVE SURG SZ 75 L12IN FNGR THK79MIL GRN LTX FREE

## (undated) DEVICE — TRANSITION ROM KNEE BRACE: Brand: DEROYAL

## (undated) DEVICE — GLOVE ORANGE PI 8   MSG9080

## (undated) DEVICE — SUTURE RETRIEVER

## (undated) DEVICE — DRSG FOAM MEPILEX TRNSF 6X8IN --

## (undated) DEVICE — SYR 20ML LL STRL LF --

## (undated) DEVICE — HANDPIECE SET WITH HIGH FLOW TIP AND SUCTION TUBE: Brand: INTERPULSE

## (undated) DEVICE — GARMENT,MEDLINE,DVT,INT,CALF,MED, GEN2: Brand: MEDLINE

## (undated) DEVICE — INTENT TO BE USED WITH SUTURE MATERIAL FOR TISSUE CLOSURE: Brand: RICHARD-ALLAN® NEEDLE 1/2 CIRCLE TAPER

## (undated) DEVICE — TRAY PHAR SYR 20ML PLAS LUERLOCK TIP N CTRL CONVENIENCE

## (undated) DEVICE — SYSTEM SKIN CLSR 22CM DERMBND PRINEO

## (undated) DEVICE — PREP SKN CHLRAPRP APL 26ML STR --

## (undated) DEVICE — NEEDLE HYPO 22GA L1.5IN BLK S STL HUB POLYPR SHLD REG BVL

## (undated) DEVICE — ULTRATAPE 2MM BLUE 38 PKG OF 6